# Patient Record
Sex: MALE | Race: WHITE | Employment: UNEMPLOYED | URBAN - METROPOLITAN AREA
[De-identification: names, ages, dates, MRNs, and addresses within clinical notes are randomized per-mention and may not be internally consistent; named-entity substitution may affect disease eponyms.]

---

## 2018-10-11 ENCOUNTER — APPOINTMENT (OUTPATIENT)
Dept: GENERAL RADIOLOGY | Age: 68
DRG: 189 | End: 2018-10-11
Payer: MEDICARE

## 2018-10-11 ENCOUNTER — HOSPITAL ENCOUNTER (INPATIENT)
Age: 68
LOS: 10 days | Discharge: HOME HEALTH | DRG: 189 | End: 2018-10-21
Attending: EMERGENCY MEDICINE | Admitting: INTERNAL MEDICINE
Payer: MEDICARE

## 2018-10-11 DIAGNOSIS — J44.1 COPD EXACERBATION (HCC): ICD-10-CM

## 2018-10-11 DIAGNOSIS — J96.02 ACUTE HYPERCAPNIC RESPIRATORY FAILURE (HCC): Primary | ICD-10-CM

## 2018-10-11 LAB
ANION GAP SERPL CALCULATED.3IONS-SCNC: 8 MMOL/L (ref 3–16)
ANISOCYTOSIS: ABNORMAL
BANDED NEUTROPHILS RELATIVE PERCENT: 2 % (ref 0–7)
BASE EXCESS ARTERIAL: 2.1 MMOL/L (ref -3–3)
BASE EXCESS ARTERIAL: 2.3 MMOL/L (ref -3–3)
BASOPHILS ABSOLUTE: 0.1 K/UL (ref 0–0.2)
BASOPHILS RELATIVE PERCENT: 1 %
BUN BLDV-MCNC: 12 MG/DL (ref 7–20)
CALCIUM SERPL-MCNC: 9.2 MG/DL (ref 8.3–10.6)
CARBOXYHEMOGLOBIN ARTERIAL: 0.7 % (ref 0–1.5)
CARBOXYHEMOGLOBIN ARTERIAL: 0.8 % (ref 0–1.5)
CHLORIDE BLD-SCNC: 99 MMOL/L (ref 99–110)
CO2: 32 MMOL/L (ref 21–32)
CREAT SERPL-MCNC: 0.7 MG/DL (ref 0.8–1.3)
EOSINOPHILS ABSOLUTE: 0.4 K/UL (ref 0–0.6)
EOSINOPHILS RELATIVE PERCENT: 3 %
GFR AFRICAN AMERICAN: >60
GFR NON-AFRICAN AMERICAN: >60
GLUCOSE BLD-MCNC: 102 MG/DL (ref 70–99)
HCO3 ARTERIAL: 29.5 MMOL/L (ref 21–29)
HCO3 ARTERIAL: 32.2 MMOL/L (ref 21–29)
HCT VFR BLD CALC: 44.9 % (ref 40.5–52.5)
HEMATOLOGY PATH CONSULT: YES
HEMOGLOBIN, ART, EXTENDED: 14.5 G/DL (ref 13.5–17.5)
HEMOGLOBIN, ART, EXTENDED: 15.3 G/DL (ref 13.5–17.5)
HEMOGLOBIN: 14.8 G/DL (ref 13.5–17.5)
LACTIC ACID: 0.8 MMOL/L (ref 0.4–2)
LYMPHOCYTES ABSOLUTE: 3.4 K/UL (ref 1–5.1)
LYMPHOCYTES RELATIVE PERCENT: 26 %
MCH RBC QN AUTO: 31.8 PG (ref 26–34)
MCHC RBC AUTO-ENTMCNC: 32.9 G/DL (ref 31–36)
MCV RBC AUTO: 96.7 FL (ref 80–100)
METHEMOGLOBIN ARTERIAL: 0.4 %
METHEMOGLOBIN ARTERIAL: 0.6 %
MONOCYTES ABSOLUTE: 1.8 K/UL (ref 0–1.3)
MONOCYTES RELATIVE PERCENT: 14 %
NEUTROPHILS ABSOLUTE: 7.3 K/UL (ref 1.7–7.7)
NEUTROPHILS RELATIVE PERCENT: 54 %
O2 CONTENT ARTERIAL: 20 ML/DL
O2 CONTENT ARTERIAL: 21 ML/DL
O2 SAT, ARTERIAL: 97.2 %
O2 SAT, ARTERIAL: 99 %
O2 THERAPY: ABNORMAL
O2 THERAPY: ABNORMAL
PCO2 ARTERIAL: 60.1 MMHG (ref 35–45)
PCO2 ARTERIAL: 76.8 MMHG (ref 35–45)
PDW BLD-RTO: 15 % (ref 12.4–15.4)
PH ARTERIAL: 7.25 (ref 7.35–7.45)
PH ARTERIAL: 7.31 (ref 7.35–7.45)
PLATELET # BLD: 205 K/UL (ref 135–450)
PMV BLD AUTO: 10.1 FL (ref 5–10.5)
PO2 ARTERIAL: 193 MMHG (ref 75–108)
PO2 ARTERIAL: 95.6 MMHG (ref 75–108)
POIKILOCYTES: ABNORMAL
POTASSIUM SERPL-SCNC: 4.5 MMOL/L (ref 3.5–5.1)
PRO-BNP: 326 PG/ML (ref 0–124)
RBC # BLD: 4.65 M/UL (ref 4.2–5.9)
SLIDE REVIEW: ABNORMAL
SODIUM BLD-SCNC: 139 MMOL/L (ref 136–145)
TCO2 ARTERIAL: 31.3 MMOL/L
TCO2 ARTERIAL: 34.5 MMOL/L
TROPONIN: <0.01 NG/ML
WBC # BLD: 13 K/UL (ref 4–11)

## 2018-10-11 PROCEDURE — 96375 TX/PRO/DX INJ NEW DRUG ADDON: CPT

## 2018-10-11 PROCEDURE — 36600 WITHDRAWAL OF ARTERIAL BLOOD: CPT

## 2018-10-11 PROCEDURE — 96365 THER/PROPH/DIAG IV INF INIT: CPT

## 2018-10-11 PROCEDURE — 6360000002 HC RX W HCPCS: Performed by: INTERNAL MEDICINE

## 2018-10-11 PROCEDURE — 93005 ELECTROCARDIOGRAM TRACING: CPT | Performed by: EMERGENCY MEDICINE

## 2018-10-11 PROCEDURE — 2580000003 HC RX 258: Performed by: EMERGENCY MEDICINE

## 2018-10-11 PROCEDURE — 87040 BLOOD CULTURE FOR BACTERIA: CPT

## 2018-10-11 PROCEDURE — 80048 BASIC METABOLIC PNL TOTAL CA: CPT

## 2018-10-11 PROCEDURE — 6360000002 HC RX W HCPCS: Performed by: NURSE PRACTITIONER

## 2018-10-11 PROCEDURE — 99223 1ST HOSP IP/OBS HIGH 75: CPT | Performed by: INTERNAL MEDICINE

## 2018-10-11 PROCEDURE — 2500000003 HC RX 250 WO HCPCS: Performed by: EMERGENCY MEDICINE

## 2018-10-11 PROCEDURE — 83605 ASSAY OF LACTIC ACID: CPT

## 2018-10-11 PROCEDURE — 87801 DETECT AGNT MULT DNA AMPLI: CPT

## 2018-10-11 PROCEDURE — 2580000003 HC RX 258: Performed by: INTERNAL MEDICINE

## 2018-10-11 PROCEDURE — 94640 AIRWAY INHALATION TREATMENT: CPT

## 2018-10-11 PROCEDURE — 94762 N-INVAS EAR/PLS OXIMTRY CONT: CPT

## 2018-10-11 PROCEDURE — 82803 BLOOD GASES ANY COMBINATION: CPT

## 2018-10-11 PROCEDURE — 71045 X-RAY EXAM CHEST 1 VIEW: CPT

## 2018-10-11 PROCEDURE — 83880 ASSAY OF NATRIURETIC PEPTIDE: CPT

## 2018-10-11 PROCEDURE — 2700000000 HC OXYGEN THERAPY PER DAY

## 2018-10-11 PROCEDURE — 6360000002 HC RX W HCPCS: Performed by: EMERGENCY MEDICINE

## 2018-10-11 PROCEDURE — 51798 US URINE CAPACITY MEASURE: CPT

## 2018-10-11 PROCEDURE — 85025 COMPLETE CBC W/AUTO DIFF WBC: CPT

## 2018-10-11 PROCEDURE — 84484 ASSAY OF TROPONIN QUANT: CPT

## 2018-10-11 PROCEDURE — 2500000003 HC RX 250 WO HCPCS: Performed by: INTERNAL MEDICINE

## 2018-10-11 PROCEDURE — 99285 EMERGENCY DEPT VISIT HI MDM: CPT

## 2018-10-11 PROCEDURE — 94660 CPAP INITIATION&MGMT: CPT

## 2018-10-11 PROCEDURE — 36415 COLL VENOUS BLD VENIPUNCTURE: CPT

## 2018-10-11 PROCEDURE — 2000000000 HC ICU R&B

## 2018-10-11 RX ORDER — ATORVASTATIN CALCIUM 40 MG/1
20 TABLET, FILM COATED ORAL NIGHTLY
Status: ON HOLD | COMMUNITY
End: 2019-06-10

## 2018-10-11 RX ORDER — PREDNISONE 10 MG/1
10-40 TABLET ORAL DAILY
Status: ON HOLD | COMMUNITY
End: 2018-10-21

## 2018-10-11 RX ORDER — ALBUTEROL SULFATE 90 UG/1
2 AEROSOL, METERED RESPIRATORY (INHALATION) EVERY 6 HOURS PRN
COMMUNITY

## 2018-10-11 RX ORDER — METHYLPREDNISOLONE SODIUM SUCCINATE 125 MG/2ML
125 INJECTION, POWDER, LYOPHILIZED, FOR SOLUTION INTRAMUSCULAR; INTRAVENOUS ONCE
Status: COMPLETED | OUTPATIENT
Start: 2018-10-11 | End: 2018-10-11

## 2018-10-11 RX ORDER — ONDANSETRON 2 MG/ML
4 INJECTION INTRAMUSCULAR; INTRAVENOUS EVERY 6 HOURS PRN
Status: DISCONTINUED | OUTPATIENT
Start: 2018-10-11 | End: 2018-10-21 | Stop reason: HOSPADM

## 2018-10-11 RX ORDER — NICOTINE 21 MG/24HR
1 PATCH, TRANSDERMAL 24 HOURS TRANSDERMAL EVERY 24 HOURS
Status: ON HOLD | COMMUNITY
End: 2019-06-10

## 2018-10-11 RX ORDER — ALBUTEROL SULFATE 2.5 MG/3ML
2.5 SOLUTION RESPIRATORY (INHALATION)
Status: DISCONTINUED | OUTPATIENT
Start: 2018-10-11 | End: 2018-10-12

## 2018-10-11 RX ORDER — BUDESONIDE 0.5 MG/2ML
0.5 INHALANT ORAL 2 TIMES DAILY
Status: DISCONTINUED | OUTPATIENT
Start: 2018-10-11 | End: 2018-10-15

## 2018-10-11 RX ORDER — ASPIRIN 81 MG/1
81 TABLET ORAL DAILY
Status: ON HOLD | COMMUNITY
End: 2019-06-12 | Stop reason: HOSPADM

## 2018-10-11 RX ORDER — GUAIFENESIN/DEXTROMETHORPHAN 100-10MG/5
5 SYRUP ORAL 4 TIMES DAILY PRN
COMMUNITY

## 2018-10-11 RX ORDER — LORAZEPAM 2 MG/ML
1 INJECTION INTRAMUSCULAR ONCE
Status: COMPLETED | OUTPATIENT
Start: 2018-10-11 | End: 2018-10-11

## 2018-10-11 RX ORDER — ALBUTEROL SULFATE 2.5 MG/3ML
SOLUTION RESPIRATORY (INHALATION)
Status: DISCONTINUED
Start: 2018-10-11 | End: 2018-10-11

## 2018-10-11 RX ORDER — METHYLPREDNISOLONE SODIUM SUCCINATE 40 MG/ML
40 INJECTION, POWDER, LYOPHILIZED, FOR SOLUTION INTRAMUSCULAR; INTRAVENOUS EVERY 6 HOURS
Status: DISCONTINUED | OUTPATIENT
Start: 2018-10-11 | End: 2018-10-13

## 2018-10-11 RX ORDER — BUDESONIDE AND FORMOTEROL FUMARATE DIHYDRATE 80; 4.5 UG/1; UG/1
2 AEROSOL RESPIRATORY (INHALATION) 2 TIMES DAILY
COMMUNITY

## 2018-10-11 RX ORDER — TAMSULOSIN HYDROCHLORIDE 0.4 MG/1
0.4 CAPSULE ORAL NIGHTLY
COMMUNITY

## 2018-10-11 RX ORDER — METHYLPREDNISOLONE SODIUM SUCCINATE 40 MG/ML
40 INJECTION, POWDER, LYOPHILIZED, FOR SOLUTION INTRAMUSCULAR; INTRAVENOUS EVERY 12 HOURS
Status: DISCONTINUED | OUTPATIENT
Start: 2018-10-12 | End: 2018-10-11

## 2018-10-11 RX ORDER — POLYETHYLENE GLYCOL 3350 17 G
2 POWDER IN PACKET (EA) ORAL PRN
Status: ON HOLD | COMMUNITY
End: 2019-06-10

## 2018-10-11 RX ORDER — OMEPRAZOLE 20 MG/1
20 CAPSULE, DELAYED RELEASE ORAL DAILY
Status: ON HOLD | COMMUNITY
End: 2019-06-12 | Stop reason: HOSPADM

## 2018-10-11 RX ORDER — ALBUTEROL SULFATE 2.5 MG/3ML
2.5 SOLUTION RESPIRATORY (INHALATION) EVERY 6 HOURS PRN
COMMUNITY

## 2018-10-11 RX ORDER — FORMOTEROL FUMARATE 20 UG/2ML
20 SOLUTION RESPIRATORY (INHALATION) 2 TIMES DAILY
Status: DISCONTINUED | OUTPATIENT
Start: 2018-10-11 | End: 2018-10-21 | Stop reason: HOSPADM

## 2018-10-11 RX ORDER — LOSARTAN POTASSIUM 100 MG/1
50 TABLET ORAL DAILY
Status: ON HOLD | COMMUNITY
End: 2019-06-10

## 2018-10-11 RX ORDER — SODIUM CHLORIDE 0.9 % (FLUSH) 0.9 %
10 SYRINGE (ML) INJECTION PRN
Status: DISCONTINUED | OUTPATIENT
Start: 2018-10-11 | End: 2018-10-21 | Stop reason: HOSPADM

## 2018-10-11 RX ORDER — IPRATROPIUM BROMIDE AND ALBUTEROL SULFATE 2.5; .5 MG/3ML; MG/3ML
1 SOLUTION RESPIRATORY (INHALATION)
Status: DISCONTINUED | OUTPATIENT
Start: 2018-10-11 | End: 2018-10-11

## 2018-10-11 RX ORDER — SODIUM CHLORIDE 0.9 % (FLUSH) 0.9 %
10 SYRINGE (ML) INJECTION EVERY 12 HOURS SCHEDULED
Status: DISCONTINUED | OUTPATIENT
Start: 2018-10-11 | End: 2018-10-21 | Stop reason: HOSPADM

## 2018-10-11 RX ADMIN — DOXYCYCLINE 100 MG: 100 INJECTION, POWDER, LYOPHILIZED, FOR SOLUTION INTRAVENOUS at 20:53

## 2018-10-11 RX ADMIN — LORAZEPAM 1 MG: 2 INJECTION INTRAMUSCULAR; INTRAVENOUS at 13:18

## 2018-10-11 RX ADMIN — Medication 10 ML: at 20:53

## 2018-10-11 RX ADMIN — ENOXAPARIN SODIUM 40 MG: 40 INJECTION SUBCUTANEOUS at 15:37

## 2018-10-11 RX ADMIN — DOXYCYCLINE 100 MG: 100 INJECTION, POWDER, LYOPHILIZED, FOR SOLUTION INTRAVENOUS at 14:31

## 2018-10-11 RX ADMIN — ALBUTEROL SULFATE 2.5 MG: 2.5 SOLUTION RESPIRATORY (INHALATION) at 19:45

## 2018-10-11 RX ADMIN — FORMOTEROL FUMARATE DIHYDRATE 20 MCG: 20 SOLUTION RESPIRATORY (INHALATION) at 19:44

## 2018-10-11 RX ADMIN — METHYLPREDNISOLONE SODIUM SUCCINATE 40 MG: 40 INJECTION, POWDER, FOR SOLUTION INTRAMUSCULAR; INTRAVENOUS at 17:51

## 2018-10-11 RX ADMIN — ALBUTEROL SULFATE 2.5 MG: 2.5 SOLUTION RESPIRATORY (INHALATION) at 16:08

## 2018-10-11 RX ADMIN — BUDESONIDE 500 MCG: 0.5 SUSPENSION RESPIRATORY (INHALATION) at 19:45

## 2018-10-11 RX ADMIN — METHYLPREDNISOLONE SODIUM SUCCINATE 125 MG: 125 INJECTION, POWDER, FOR SOLUTION INTRAMUSCULAR; INTRAVENOUS at 13:20

## 2018-10-11 ASSESSMENT — PAIN SCALES - GENERAL
PAINLEVEL_OUTOF10: 0

## 2018-10-11 NOTE — PROGRESS NOTES
Wife stated patient had flu and pneum vaccine last week (Oct. 2018) at [facility], wife unsure what pneum vaccine he received , this RN asked wife to bring in copy of vaccine documentation when  she can

## 2018-10-11 NOTE — ED NOTES
Pt arrived to the ED via EMS, pt was having difficulty breathing since las evening, pt states this afternoon was severe, family called 911, pt arrived to ED on a non rebreather, pt is alert and orient, pt denies any pain, pt has increased respirations, pt's daughter is at the bedside      Artur Reddy, RN  10/11/18 [id number]

## 2018-10-11 NOTE — CONSULTS
abnormality. Findings suggestive COPD.       Mild asymmetric interstitial opacities are seen within the left base which   may be related to scar or fibrosis.  No definite acute process.

## 2018-10-11 NOTE — ED PROVIDER NOTES
distressed. HENT:   Head: Normocephalic and atraumatic. Eyes: Conjunctivae and EOM are normal. Pupils are equal, round, and reactive to light. Right eye exhibits no discharge. Left eye exhibits no discharge. No scleral icterus. Neck: No JVD present. No tracheal deviation present. Cardiovascular: Normal rate, regular rhythm, normal heart sounds and intact distal pulses. Exam reveals no gallop and no friction rub. No murmur heard. Healed-appearing midline sternotomy incision. Pulmonary/Chest: Pt has diffuse wheezing b/l. Pt has retractions. Pt is not cyanotic. Pt speaks in words, not complete sentences. No rales. No rhonchi. Abdominal: Soft. Bowel sounds are normal. He exhibits no distension and no mass. There is no tenderness. There is no rebound and no guarding. No HSM. Musculoskeletal: He exhibits no edema. No lower ext ttp or palpable cords. Neurological: He is alert and oriented to person, place, and time. He exhibits normal muscle tone. Coordination normal.   Skin: No rash noted. He is not diaphoretic. No erythema. No pallor.      PROCEDURES   Procedures    DIAGNOSTIC RESULTS   DIAGNOSTICS:   Labs:  Recent Results (from the past 24 hour(s))   CBC Auto Differential    Collection Time: 10/11/18  1:23 PM   Result Value Ref Range    WBC 13.0 (H) 4.0 - 11.0 K/uL    RBC 4.65 4.20 - 5.90 M/uL    Hemoglobin 14.8 13.5 - 17.5 g/dL    Hematocrit 44.9 40.5 - 52.5 %    MCV 96.7 80.0 - 100.0 fL    MCH 31.8 26.0 - 34.0 pg    MCHC 32.9 31.0 - 36.0 g/dL    RDW 15.0 12.4 - 15.4 %    Platelets 863 495 - 770 K/uL    MPV 10.1 5.0 - 10.5 fL    SLIDE REVIEW see below     Path Consult Yes     Neutrophils % 54.0 %    Lymphocytes % 26.0 %    Monocytes % 14.0 %    Eosinophils % 3.0 %    Basophils % 1.0 %    Neutrophils # 7.3 1.7 - 7.7 K/uL    Lymphocytes # 3.4 1.0 - 5.1 K/uL    Monocytes # 1.8 (H) 0.0 - 1.3 K/uL    Eosinophils # 0.4 0.0 - 0.6 K/uL    Basophils # 0.1 0.0 - 0.2 K/uL    Bands Relative 2 0 - 7 %    Anisocytosis 110. There is 1 PVC present. Axis is   Right axis deviation  QTc is  within an acceptable range  ST Segments: no STEMI.    []   7436 D/w hospitalist who is admitting pt  []      ED Course User Index  [] Myesha Duffy MD       EMERGENCY DEPARTMENT MEDICAL DECISION MAKING:  After obtaining the patient's history, performing thephysical exam and reviewing the diagnostics, multiple  initial diagnoses were considered based on the presenting problem. Pt started on BiPAP on arrival in ED. Pt improved in ED w/ BiPAP. He is tolerating BiPAP well. GCS 15. Pt says no h/o VTE or recent travel/surg/immobility. PE unlikely. Pt reassessed mult times in ED & no chest pain. Critical Care Time:  Total critical care time for this patient was 32 minutes exclusive of separately billable procedures. DIAGNOSIS:  After the evaluation in the Emergency Department, my clinical impression is acute hypercapnic respiratory failure, COPD exacerbation . ED Meds:  Continuous albuterol nebulizer  IV solumedrol  IVPB doxycycline  IV ativan    PLAN: admit to ICU.       (Please note that portions of this note were completed with a voice recognition program.  Efforts were made to edit the dictations but occasionally words are mis-transcribed.)    yMesha Duffy MD (electronically signed)         Myesha Duffy MD  10/11/18 1945

## 2018-10-12 LAB
ANION GAP SERPL CALCULATED.3IONS-SCNC: 13 MMOL/L (ref 3–16)
BASOPHILS ABSOLUTE: 0 K/UL (ref 0–0.2)
BASOPHILS RELATIVE PERCENT: 0.5 %
BUN BLDV-MCNC: 19 MG/DL (ref 7–20)
CALCIUM SERPL-MCNC: 9.2 MG/DL (ref 8.3–10.6)
CHLORIDE BLD-SCNC: 97 MMOL/L (ref 99–110)
CO2: 29 MMOL/L (ref 21–32)
CREAT SERPL-MCNC: 0.7 MG/DL (ref 0.8–1.3)
EKG ATRIAL RATE: 110 BPM
EKG DIAGNOSIS: NORMAL
EKG P AXIS: 81 DEGREES
EKG P-R INTERVAL: 166 MS
EKG Q-T INTERVAL: 340 MS
EKG QRS DURATION: 94 MS
EKG QTC CALCULATION (BAZETT): 460 MS
EKG R AXIS: 110 DEGREES
EKG T AXIS: 74 DEGREES
EKG VENTRICULAR RATE: 110 BPM
EOSINOPHILS ABSOLUTE: 0 K/UL (ref 0–0.6)
EOSINOPHILS RELATIVE PERCENT: 0 %
GFR AFRICAN AMERICAN: >60
GFR NON-AFRICAN AMERICAN: >60
GLUCOSE BLD-MCNC: 121 MG/DL (ref 70–99)
HCT VFR BLD CALC: 42 % (ref 40.5–52.5)
HEMATOLOGY PATH CONSULT: NORMAL
HEMOGLOBIN: 14 G/DL (ref 13.5–17.5)
LYMPHOCYTES ABSOLUTE: 0.7 K/UL (ref 1–5.1)
LYMPHOCYTES RELATIVE PERCENT: 7 %
MAGNESIUM: 2.1 MG/DL (ref 1.8–2.4)
MCH RBC QN AUTO: 31.9 PG (ref 26–34)
MCHC RBC AUTO-ENTMCNC: 33.4 G/DL (ref 31–36)
MCV RBC AUTO: 95.7 FL (ref 80–100)
MONOCYTES ABSOLUTE: 0.2 K/UL (ref 0–1.3)
MONOCYTES RELATIVE PERCENT: 2.6 %
NEUTROPHILS ABSOLUTE: 8.4 K/UL (ref 1.7–7.7)
NEUTROPHILS RELATIVE PERCENT: 89.9 %
PDW BLD-RTO: 15.2 % (ref 12.4–15.4)
PHOSPHORUS: 4.4 MG/DL (ref 2.5–4.9)
PLATELET # BLD: 201 K/UL (ref 135–450)
PMV BLD AUTO: 10.5 FL (ref 5–10.5)
POTASSIUM SERPL-SCNC: 4.8 MMOL/L (ref 3.5–5.1)
PROCALCITONIN: 0.06 NG/ML (ref 0–0.15)
RBC # BLD: 4.39 M/UL (ref 4.2–5.9)
REPORT: NORMAL
SODIUM BLD-SCNC: 139 MMOL/L (ref 136–145)
WBC # BLD: 9.4 K/UL (ref 4–11)

## 2018-10-12 PROCEDURE — 85025 COMPLETE CBC W/AUTO DIFF WBC: CPT

## 2018-10-12 PROCEDURE — 94640 AIRWAY INHALATION TREATMENT: CPT

## 2018-10-12 PROCEDURE — 93010 ELECTROCARDIOGRAM REPORT: CPT | Performed by: INTERNAL MEDICINE

## 2018-10-12 PROCEDURE — 2000000000 HC ICU R&B

## 2018-10-12 PROCEDURE — 2500000003 HC RX 250 WO HCPCS: Performed by: INTERNAL MEDICINE

## 2018-10-12 PROCEDURE — 2580000003 HC RX 258: Performed by: INTERNAL MEDICINE

## 2018-10-12 PROCEDURE — 6370000000 HC RX 637 (ALT 250 FOR IP): Performed by: INTERNAL MEDICINE

## 2018-10-12 PROCEDURE — 36415 COLL VENOUS BLD VENIPUNCTURE: CPT

## 2018-10-12 PROCEDURE — 6360000002 HC RX W HCPCS: Performed by: NURSE PRACTITIONER

## 2018-10-12 PROCEDURE — 94762 N-INVAS EAR/PLS OXIMTRY CONT: CPT

## 2018-10-12 PROCEDURE — 94660 CPAP INITIATION&MGMT: CPT

## 2018-10-12 PROCEDURE — 84145 PROCALCITONIN (PCT): CPT

## 2018-10-12 PROCEDURE — C9113 INJ PANTOPRAZOLE SODIUM, VIA: HCPCS | Performed by: INTERNAL MEDICINE

## 2018-10-12 PROCEDURE — 99233 SBSQ HOSP IP/OBS HIGH 50: CPT | Performed by: INTERNAL MEDICINE

## 2018-10-12 PROCEDURE — 80048 BASIC METABOLIC PNL TOTAL CA: CPT

## 2018-10-12 PROCEDURE — 6360000002 HC RX W HCPCS: Performed by: INTERNAL MEDICINE

## 2018-10-12 PROCEDURE — 84100 ASSAY OF PHOSPHORUS: CPT

## 2018-10-12 PROCEDURE — 2700000000 HC OXYGEN THERAPY PER DAY

## 2018-10-12 PROCEDURE — 83735 ASSAY OF MAGNESIUM: CPT

## 2018-10-12 RX ORDER — PANTOPRAZOLE SODIUM 40 MG/10ML
40 INJECTION, POWDER, LYOPHILIZED, FOR SOLUTION INTRAVENOUS 2 TIMES DAILY
Status: DISCONTINUED | OUTPATIENT
Start: 2018-10-12 | End: 2018-10-21 | Stop reason: HOSPADM

## 2018-10-12 RX ORDER — ASPIRIN 81 MG/1
81 TABLET ORAL DAILY
Status: DISCONTINUED | OUTPATIENT
Start: 2018-10-12 | End: 2018-10-21 | Stop reason: HOSPADM

## 2018-10-12 RX ORDER — TAMSULOSIN HYDROCHLORIDE 0.4 MG/1
0.4 CAPSULE ORAL DAILY
Status: DISCONTINUED | OUTPATIENT
Start: 2018-10-12 | End: 2018-10-21 | Stop reason: HOSPADM

## 2018-10-12 RX ORDER — PANTOPRAZOLE SODIUM 40 MG/10ML
40 INJECTION, POWDER, LYOPHILIZED, FOR SOLUTION INTRAVENOUS 2 TIMES DAILY
Status: DISCONTINUED | OUTPATIENT
Start: 2018-10-12 | End: 2018-10-12

## 2018-10-12 RX ORDER — THEOPHYLLINE 400 MG/1
400 TABLET, EXTENDED RELEASE ORAL DAILY
Status: DISCONTINUED | OUTPATIENT
Start: 2018-10-12 | End: 2018-10-21 | Stop reason: HOSPADM

## 2018-10-12 RX ORDER — ALBUTEROL SULFATE 2.5 MG/3ML
2.5 SOLUTION RESPIRATORY (INHALATION)
Status: DISCONTINUED | OUTPATIENT
Start: 2018-10-12 | End: 2018-10-13

## 2018-10-12 RX ORDER — CLONIDINE HYDROCHLORIDE 0.1 MG/1
0.2 TABLET ORAL EVERY 4 HOURS PRN
Status: DISCONTINUED | OUTPATIENT
Start: 2018-10-12 | End: 2018-10-21 | Stop reason: HOSPADM

## 2018-10-12 RX ORDER — 0.9 % SODIUM CHLORIDE 0.9 %
10 VIAL (ML) INJECTION 2 TIMES DAILY
Status: DISCONTINUED | OUTPATIENT
Start: 2018-10-12 | End: 2018-10-12

## 2018-10-12 RX ORDER — PANTOPRAZOLE SODIUM 40 MG/1
40 TABLET, DELAYED RELEASE ORAL
Status: DISCONTINUED | OUTPATIENT
Start: 2018-10-12 | End: 2018-10-12

## 2018-10-12 RX ORDER — LOSARTAN POTASSIUM 50 MG/1
100 TABLET ORAL DAILY
Status: DISCONTINUED | OUTPATIENT
Start: 2018-10-12 | End: 2018-10-21 | Stop reason: HOSPADM

## 2018-10-12 RX ORDER — DOXYCYCLINE HYCLATE 100 MG
100 TABLET ORAL EVERY 12 HOURS SCHEDULED
Status: DISCONTINUED | OUTPATIENT
Start: 2018-10-12 | End: 2018-10-12

## 2018-10-12 RX ORDER — 0.9 % SODIUM CHLORIDE 0.9 %
10 VIAL (ML) INJECTION 2 TIMES DAILY
Status: DISCONTINUED | OUTPATIENT
Start: 2018-10-12 | End: 2018-10-21 | Stop reason: HOSPADM

## 2018-10-12 RX ADMIN — PANTOPRAZOLE SODIUM 40 MG: 40 INJECTION, POWDER, FOR SOLUTION INTRAVENOUS at 19:19

## 2018-10-12 RX ADMIN — LOSARTAN POTASSIUM 100 MG: 50 TABLET ORAL at 09:57

## 2018-10-12 RX ADMIN — FORMOTEROL FUMARATE DIHYDRATE 20 MCG: 20 SOLUTION RESPIRATORY (INHALATION) at 19:48

## 2018-10-12 RX ADMIN — ONDANSETRON 4 MG: 2 INJECTION INTRAMUSCULAR; INTRAVENOUS at 15:50

## 2018-10-12 RX ADMIN — ASPIRIN 81 MG: 81 TABLET, COATED ORAL at 09:57

## 2018-10-12 RX ADMIN — PANTOPRAZOLE SODIUM 40 MG: 40 TABLET, DELAYED RELEASE ORAL at 09:57

## 2018-10-12 RX ADMIN — METHYLPREDNISOLONE SODIUM SUCCINATE 40 MG: 40 INJECTION, POWDER, FOR SOLUTION INTRAMUSCULAR; INTRAVENOUS at 12:54

## 2018-10-12 RX ADMIN — TAMSULOSIN HYDROCHLORIDE 0.4 MG: 0.4 CAPSULE ORAL at 09:56

## 2018-10-12 RX ADMIN — BUDESONIDE 500 MCG: 0.5 SUSPENSION RESPIRATORY (INHALATION) at 19:48

## 2018-10-12 RX ADMIN — ALBUTEROL SULFATE 5 MG: 2.5 SOLUTION RESPIRATORY (INHALATION) at 12:22

## 2018-10-12 RX ADMIN — THEOPHYLLINE 400 MG: 400 TABLET, EXTENDED RELEASE ORAL at 09:56

## 2018-10-12 RX ADMIN — METHYLPREDNISOLONE SODIUM SUCCINATE 40 MG: 40 INJECTION, POWDER, FOR SOLUTION INTRAMUSCULAR; INTRAVENOUS at 00:43

## 2018-10-12 RX ADMIN — ENOXAPARIN SODIUM 40 MG: 40 INJECTION SUBCUTANEOUS at 09:56

## 2018-10-12 RX ADMIN — ALBUTEROL SULFATE 2.5 MG: 2.5 SOLUTION RESPIRATORY (INHALATION) at 16:07

## 2018-10-12 RX ADMIN — LIDOCAINE HYDROCHLORIDE: 20 SOLUTION ORAL; TOPICAL at 19:24

## 2018-10-12 RX ADMIN — METHYLPREDNISOLONE SODIUM SUCCINATE 40 MG: 40 INJECTION, POWDER, FOR SOLUTION INTRAMUSCULAR; INTRAVENOUS at 05:54

## 2018-10-12 RX ADMIN — Medication 10 ML: at 19:19

## 2018-10-12 RX ADMIN — METHYLPREDNISOLONE SODIUM SUCCINATE 40 MG: 40 INJECTION, POWDER, FOR SOLUTION INTRAMUSCULAR; INTRAVENOUS at 23:14

## 2018-10-12 RX ADMIN — ALBUTEROL SULFATE 5 MG: 2.5 SOLUTION RESPIRATORY (INHALATION) at 07:59

## 2018-10-12 RX ADMIN — METHYLPREDNISOLONE SODIUM SUCCINATE 40 MG: 40 INJECTION, POWDER, FOR SOLUTION INTRAMUSCULAR; INTRAVENOUS at 17:51

## 2018-10-12 RX ADMIN — LEVOFLOXACIN 750 MG: 500 TABLET, FILM COATED ORAL at 12:46

## 2018-10-12 RX ADMIN — Medication 10 ML: at 09:57

## 2018-10-12 RX ADMIN — FORMOTEROL FUMARATE DIHYDRATE 20 MCG: 20 SOLUTION RESPIRATORY (INHALATION) at 08:00

## 2018-10-12 RX ADMIN — DOXYCYCLINE 100 MG: 100 INJECTION, POWDER, LYOPHILIZED, FOR SOLUTION INTRAVENOUS at 09:52

## 2018-10-12 RX ADMIN — Medication 10 ML: at 20:51

## 2018-10-12 RX ADMIN — ONDANSETRON 4 MG: 2 INJECTION INTRAMUSCULAR; INTRAVENOUS at 23:14

## 2018-10-12 RX ADMIN — ALBUTEROL SULFATE 2.5 MG: 2.5 SOLUTION RESPIRATORY (INHALATION) at 19:49

## 2018-10-12 RX ADMIN — TIOTROPIUM BROMIDE 18 MCG: 18 CAPSULE ORAL; RESPIRATORY (INHALATION) at 12:21

## 2018-10-12 RX ADMIN — BUDESONIDE 500 MCG: 0.5 SUSPENSION RESPIRATORY (INHALATION) at 08:00

## 2018-10-12 RX ADMIN — CLONIDINE HYDROCHLORIDE 0.2 MG: 0.1 TABLET ORAL at 20:46

## 2018-10-12 ASSESSMENT — PAIN SCALES - GENERAL
PAINLEVEL_OUTOF10: 0
PAINLEVEL_OUTOF10: 0

## 2018-10-12 NOTE — PROGRESS NOTES
2330: Resumed care from out going Norma Campbell, [facility]. Pt remain on BiPAP 20/5 Rate 20 FiO2 50% and O2 sats 92%. HR in 70's-80's NSR with PVC's. Pt sleeping with eyes closed. 0105: Pt assisted to bed side commode. Void and back in bed. Pt remain on BIPAP. Denies any acute distress. 5665: Remain on BIPAP. Tolerating well. No acute distress noted. 9195: shift hand off report given to oncelder Proctor RN.

## 2018-10-13 ENCOUNTER — APPOINTMENT (OUTPATIENT)
Dept: GENERAL RADIOLOGY | Age: 68
DRG: 189 | End: 2018-10-13
Payer: MEDICARE

## 2018-10-13 LAB
ALBUMIN SERPL-MCNC: 4 G/DL (ref 3.4–5)
ALP BLD-CCNC: 58 U/L (ref 40–129)
ALT SERPL-CCNC: 22 U/L (ref 10–40)
ANION GAP SERPL CALCULATED.3IONS-SCNC: 14 MMOL/L (ref 3–16)
AST SERPL-CCNC: 28 U/L (ref 15–37)
BASOPHILS ABSOLUTE: 0 K/UL (ref 0–0.2)
BASOPHILS RELATIVE PERCENT: 0.2 %
BILIRUB SERPL-MCNC: 0.5 MG/DL (ref 0–1)
BILIRUBIN DIRECT: <0.2 MG/DL (ref 0–0.3)
BILIRUBIN, INDIRECT: NORMAL MG/DL (ref 0–1)
BUN BLDV-MCNC: 29 MG/DL (ref 7–20)
CALCIUM SERPL-MCNC: 9.3 MG/DL (ref 8.3–10.6)
CHLORIDE BLD-SCNC: 98 MMOL/L (ref 99–110)
CO2: 29 MMOL/L (ref 21–32)
CREAT SERPL-MCNC: 0.9 MG/DL (ref 0.8–1.3)
EOSINOPHILS ABSOLUTE: 0 K/UL (ref 0–0.6)
EOSINOPHILS RELATIVE PERCENT: 0 %
GFR AFRICAN AMERICAN: >60
GFR NON-AFRICAN AMERICAN: >60
GLUCOSE BLD-MCNC: 149 MG/DL (ref 70–99)
HCT VFR BLD CALC: 43.8 % (ref 40.5–52.5)
HEMOGLOBIN: 14.3 G/DL (ref 13.5–17.5)
LV EF: 65 %
LVEF MODALITY: NORMAL
LYMPHOCYTES ABSOLUTE: 0.4 K/UL (ref 1–5.1)
LYMPHOCYTES RELATIVE PERCENT: 2.4 %
MAGNESIUM: 2.5 MG/DL (ref 1.8–2.4)
MCH RBC QN AUTO: 31.5 PG (ref 26–34)
MCHC RBC AUTO-ENTMCNC: 32.5 G/DL (ref 31–36)
MCV RBC AUTO: 96.8 FL (ref 80–100)
MONOCYTES ABSOLUTE: 0.7 K/UL (ref 0–1.3)
MONOCYTES RELATIVE PERCENT: 4.3 %
NEUTROPHILS ABSOLUTE: 15.9 K/UL (ref 1.7–7.7)
NEUTROPHILS RELATIVE PERCENT: 93.1 %
PDW BLD-RTO: 15.5 % (ref 12.4–15.4)
PHOSPHORUS: 4.1 MG/DL (ref 2.5–4.9)
PLATELET # BLD: 235 K/UL (ref 135–450)
PMV BLD AUTO: 10.3 FL (ref 5–10.5)
POTASSIUM SERPL-SCNC: 4.4 MMOL/L (ref 3.5–5.1)
PROCALCITONIN: 0.05 NG/ML (ref 0–0.15)
RBC # BLD: 4.52 M/UL (ref 4.2–5.9)
SODIUM BLD-SCNC: 141 MMOL/L (ref 136–145)
TOTAL PROTEIN: 7.3 G/DL (ref 6.4–8.2)
WBC # BLD: 17 K/UL (ref 4–11)

## 2018-10-13 PROCEDURE — 6360000002 HC RX W HCPCS: Performed by: INTERNAL MEDICINE

## 2018-10-13 PROCEDURE — 6370000000 HC RX 637 (ALT 250 FOR IP): Performed by: INTERNAL MEDICINE

## 2018-10-13 PROCEDURE — 2580000003 HC RX 258: Performed by: INTERNAL MEDICINE

## 2018-10-13 PROCEDURE — 71045 X-RAY EXAM CHEST 1 VIEW: CPT

## 2018-10-13 PROCEDURE — 36415 COLL VENOUS BLD VENIPUNCTURE: CPT

## 2018-10-13 PROCEDURE — G8978 MOBILITY CURRENT STATUS: HCPCS

## 2018-10-13 PROCEDURE — G8988 SELF CARE GOAL STATUS: HCPCS

## 2018-10-13 PROCEDURE — 97530 THERAPEUTIC ACTIVITIES: CPT

## 2018-10-13 PROCEDURE — 87040 BLOOD CULTURE FOR BACTERIA: CPT

## 2018-10-13 PROCEDURE — 94762 N-INVAS EAR/PLS OXIMTRY CONT: CPT

## 2018-10-13 PROCEDURE — 99233 SBSQ HOSP IP/OBS HIGH 50: CPT | Performed by: INTERNAL MEDICINE

## 2018-10-13 PROCEDURE — 80076 HEPATIC FUNCTION PANEL: CPT

## 2018-10-13 PROCEDURE — 2700000000 HC OXYGEN THERAPY PER DAY

## 2018-10-13 PROCEDURE — 2000000000 HC ICU R&B

## 2018-10-13 PROCEDURE — 84100 ASSAY OF PHOSPHORUS: CPT

## 2018-10-13 PROCEDURE — 6360000002 HC RX W HCPCS: Performed by: NURSE PRACTITIONER

## 2018-10-13 PROCEDURE — G8987 SELF CARE CURRENT STATUS: HCPCS

## 2018-10-13 PROCEDURE — 83735 ASSAY OF MAGNESIUM: CPT

## 2018-10-13 PROCEDURE — 85025 COMPLETE CBC W/AUTO DIFF WBC: CPT

## 2018-10-13 PROCEDURE — 80048 BASIC METABOLIC PNL TOTAL CA: CPT

## 2018-10-13 PROCEDURE — 99223 1ST HOSP IP/OBS HIGH 75: CPT | Performed by: INTERNAL MEDICINE

## 2018-10-13 PROCEDURE — 94640 AIRWAY INHALATION TREATMENT: CPT

## 2018-10-13 PROCEDURE — 97166 OT EVAL MOD COMPLEX 45 MIN: CPT

## 2018-10-13 PROCEDURE — 97162 PT EVAL MOD COMPLEX 30 MIN: CPT

## 2018-10-13 PROCEDURE — 93306 TTE W/DOPPLER COMPLETE: CPT

## 2018-10-13 PROCEDURE — 84145 PROCALCITONIN (PCT): CPT

## 2018-10-13 PROCEDURE — G8979 MOBILITY GOAL STATUS: HCPCS

## 2018-10-13 PROCEDURE — C9113 INJ PANTOPRAZOLE SODIUM, VIA: HCPCS | Performed by: INTERNAL MEDICINE

## 2018-10-13 RX ORDER — ALBUTEROL SULFATE 2.5 MG/3ML
2.5 SOLUTION RESPIRATORY (INHALATION)
Status: DISCONTINUED | OUTPATIENT
Start: 2018-10-13 | End: 2018-10-21 | Stop reason: HOSPADM

## 2018-10-13 RX ORDER — METHYLPREDNISOLONE SODIUM SUCCINATE 40 MG/ML
40 INJECTION, POWDER, LYOPHILIZED, FOR SOLUTION INTRAMUSCULAR; INTRAVENOUS EVERY 12 HOURS
Status: DISCONTINUED | OUTPATIENT
Start: 2018-10-13 | End: 2018-10-16

## 2018-10-13 RX ADMIN — ALBUTEROL SULFATE 2.5 MG: 2.5 SOLUTION RESPIRATORY (INHALATION) at 04:32

## 2018-10-13 RX ADMIN — LEVOFLOXACIN 750 MG: 500 TABLET, FILM COATED ORAL at 09:27

## 2018-10-13 RX ADMIN — FORMOTEROL FUMARATE DIHYDRATE 20 MCG: 20 SOLUTION RESPIRATORY (INHALATION) at 20:36

## 2018-10-13 RX ADMIN — PANTOPRAZOLE SODIUM 40 MG: 40 INJECTION, POWDER, FOR SOLUTION INTRAVENOUS at 20:32

## 2018-10-13 RX ADMIN — BUDESONIDE 500 MCG: 0.5 SUSPENSION RESPIRATORY (INHALATION) at 08:08

## 2018-10-13 RX ADMIN — BUDESONIDE 500 MCG: 0.5 SUSPENSION RESPIRATORY (INHALATION) at 20:36

## 2018-10-13 RX ADMIN — Medication 10 ML: at 09:28

## 2018-10-13 RX ADMIN — TIOTROPIUM BROMIDE 18 MCG: 18 CAPSULE ORAL; RESPIRATORY (INHALATION) at 08:08

## 2018-10-13 RX ADMIN — ALBUTEROL SULFATE 2.5 MG: 2.5 SOLUTION RESPIRATORY (INHALATION) at 01:07

## 2018-10-13 RX ADMIN — PANTOPRAZOLE SODIUM 40 MG: 40 INJECTION, POWDER, FOR SOLUTION INTRAVENOUS at 09:27

## 2018-10-13 RX ADMIN — ALBUTEROL SULFATE 2.5 MG: 2.5 SOLUTION RESPIRATORY (INHALATION) at 20:36

## 2018-10-13 RX ADMIN — CEFTRIAXONE 2 G: 2 INJECTION, POWDER, FOR SOLUTION INTRAMUSCULAR; INTRAVENOUS at 11:42

## 2018-10-13 RX ADMIN — ENOXAPARIN SODIUM 40 MG: 40 INJECTION SUBCUTANEOUS at 09:27

## 2018-10-13 RX ADMIN — Medication 10 ML: at 20:33

## 2018-10-13 RX ADMIN — FORMOTEROL FUMARATE DIHYDRATE 20 MCG: 20 SOLUTION RESPIRATORY (INHALATION) at 08:07

## 2018-10-13 RX ADMIN — ALBUTEROL SULFATE 2.5 MG: 2.5 SOLUTION RESPIRATORY (INHALATION) at 08:07

## 2018-10-13 RX ADMIN — ALBUTEROL SULFATE 2.5 MG: 2.5 SOLUTION RESPIRATORY (INHALATION) at 15:52

## 2018-10-13 RX ADMIN — TAMSULOSIN HYDROCHLORIDE 0.4 MG: 0.4 CAPSULE ORAL at 09:27

## 2018-10-13 RX ADMIN — ALBUTEROL SULFATE 2.5 MG: 2.5 SOLUTION RESPIRATORY (INHALATION) at 12:00

## 2018-10-13 RX ADMIN — METHYLPREDNISOLONE SODIUM SUCCINATE 40 MG: 40 INJECTION, POWDER, FOR SOLUTION INTRAMUSCULAR; INTRAVENOUS at 06:13

## 2018-10-13 RX ADMIN — Medication 10 ML: at 20:32

## 2018-10-13 RX ADMIN — ASPIRIN 81 MG: 81 TABLET, COATED ORAL at 09:27

## 2018-10-13 RX ADMIN — METHYLPREDNISOLONE SODIUM SUCCINATE 40 MG: 40 INJECTION, POWDER, FOR SOLUTION INTRAMUSCULAR; INTRAVENOUS at 18:24

## 2018-10-13 RX ADMIN — LOSARTAN POTASSIUM 100 MG: 50 TABLET ORAL at 09:27

## 2018-10-13 RX ADMIN — Medication 10 ML: at 09:33

## 2018-10-13 RX ADMIN — CLONIDINE HYDROCHLORIDE 0.2 MG: 0.1 TABLET ORAL at 04:07

## 2018-10-13 RX ADMIN — THEOPHYLLINE 400 MG: 400 TABLET, EXTENDED RELEASE ORAL at 09:27

## 2018-10-13 ASSESSMENT — PAIN SCALES - GENERAL
PAINLEVEL_OUTOF10: 0

## 2018-10-13 NOTE — PROGRESS NOTES
breath     HISTORY:  He has intermittent dyspnea at rest but severe dyspnea on exertion. He has cough and wheezing. REVIEW OF SYMPTOMS:  No chest pain or palpitations. He has heartburn with nausea. IV:      Intake/Output Summary (Last 24 hours) at 10/13/18 1004  Last data filed at 10/13/18 0958   Gross per 24 hour   Intake             1320 ml   Output              980 ml   Net              340 ml       MEDICATIONS:  Scheduled Meds:   albuterol  2.5 mg Nebulization Q4H WA    methylPREDNISolone  40 mg Intravenous Q12H    cefTRIAXone (ROCEPHIN) IV  2 g Intravenous Once    theophylline  400 mg Oral Daily    tamsulosin  0.4 mg Oral Daily    losartan  100 mg Oral Daily    aspirin  81 mg Oral Daily    tiotropium  18 mcg Inhalation Daily    levofloxacin  750 mg Oral Daily    pantoprazole  40 mg Intravenous BID    And    sodium chloride (PF)  10 mL Intravenous BID    sodium chloride flush  10 mL Intravenous 2 times per day    enoxaparin  40 mg Subcutaneous Daily    budesonide  0.5 mg Nebulization BID    formoterol  20 mcg Nebulization BID     PRN Meds:  perflutren lipid microspheres, cloNIDine, GI cocktail, sodium chloride flush, magnesium hydroxide, ondansetron      PHYSICAL EXAM:  Vital Signs: /80   Pulse 89   Temp 97.7 °F (36.5 °C) (Oral)   Resp 18   Ht 5' 8\" (1.727 m)   Wt 190 lb 7.6 oz (86.4 kg)   SpO2 (!) 87%   BMI 28.96 kg/m²      Gen:   No distress. Breathing comfortably at rest.   ENT:   Nasal cannula in place. Neck:   Trachea is midline. No JVD. Resp:   No accessory muscle use. Bilateral wheezing. CV:   PMI is normal. No murmur or gallop. GI:   Soft and not distended. No tenderness. M/S:  No joint swelling or tenderness in RUE, LUE, RLE, or LLE. No edema. Neuro:  Awake and alert. Normal muscle tone. No tremor.        LAB RESULTS:  CBC:   Recent Labs      10/11/18   1323  10/12/18   0439  10/13/18   0421   WBC  13.0*  9.4  17.0*   HGB  14.8  14.0  14.3   HCT

## 2018-10-13 NOTE — PROGRESS NOTES
weak and SOB and hence squad was called and pt was brought to the ER. ABG revealed hypercapnia and hence pt placed on BIPAP and admitted. Was very anxious on the BIPAP and received ativan and pt very lethargic at present\"(per note Dr Carolina Soto 10/11/18)  Family / Caregiver Present: No  Referring Practitioner: Carolina Soto  Diagnosis: hypercapnia  Pain Assessment  Patient Currently in Pain: No  Pain Assessment: 0-10  Pain Level: 0  RASS Score (Ventilated): Alert and calm  Oxygen Therapy  SpO2: (!) 87 %  Pulse Oximeter Device Mode: Continuous  Pulse Oximeter Device Location: Right;Finger  O2 Device: High flow nasal cannula  Skin Protection for O2 Device: No  O2 Flow Rate (L/min): 15 L/min  Blood Gas  Performed?: No  Social/Functional History  Social/Functional History  Lives With: Spouse  Type of Home: House  Home Layout: One level  Home Access: Stairs to enter with rails  Entrance Stairs - Number of Steps: 1 nery   Bathroom Shower/Tub: Tub/Shower unit (Simultaneous filing. User may not have seen previous data.)  Bathroom Toilet: Handicap height (Simultaneous filing. User may not have seen previous data.)  ADL Assistance: Independent  Homemaking Assistance: Independent (Simultaneous filing.  User may not have seen previous data.)  Homemaking Responsibilities: Yes  Ambulation Assistance: Independent  Transfer Assistance: Independent  Additional Comments: Pt stated he is indep with IADL's, was recently working on the roof - (takes small oxygen tank with him)       Objective        Orientation  Overall Orientation Status: Within Functional Limits     Functional Mobility  Functional - Mobility Device: No device  Assist Level: Contact guard assistance  Functional Mobility Comments: couple steps from bed to recliner with light CGA, multiple lines to monitor  Wheelchair Bed Transfers  Wheelchair/Bed - Technique: Stand step  Equipment Used: Bed  Level of Asssistance: Contact guard assistance  Wheelchair Transfers Comments: assist goals: by discharge  Short term goal 1: self care indep with oxygen  Short term goal 2: transfers indep with oxygen, no AD  Short term goal 3: funcitonal mobility indep with oxygen , no AD  Short term goal 4: increased activity tolerance, use of pursed lip breathing to maintain adequate oxygen sats  Long term goals  Time Frame for Long term goals : same as stg  Patient Goals   Patient goals : \"I just want to get stronger to get out of here \"  Agreed he wanted to be able to get up and walk do his own personal care\"       Therapy Time   Individual Concurrent Group Co-treatment   Time In 0805         Time Out 0855         Minutes 3 [facility], OTR/L 770

## 2018-10-13 NOTE — PROGRESS NOTES
Hospitalist Progress Note      PCP: [facility]    Date of Admission: 10/11/2018    Subjective: used BIPAP overnight, SOB improved from yesterday, on vapotherm    Medications:  Reviewed    Infusion Medications   Scheduled Medications    albuterol  2.5 mg Nebulization Q4H WA    methylPREDNISolone  40 mg Intravenous Q12H    theophylline  400 mg Oral Daily    tamsulosin  0.4 mg Oral Daily    losartan  100 mg Oral Daily    aspirin  81 mg Oral Daily    tiotropium  18 mcg Inhalation Daily    levofloxacin  750 mg Oral Daily    pantoprazole  40 mg Intravenous BID    And    sodium chloride (PF)  10 mL Intravenous BID    sodium chloride flush  10 mL Intravenous 2 times per day    enoxaparin  40 mg Subcutaneous Daily    budesonide  0.5 mg Nebulization BID    formoterol  20 mcg Nebulization BID     PRN Meds: perflutren lipid microspheres, cloNIDine, GI cocktail, sodium chloride flush, magnesium hydroxide, ondansetron      Intake/Output Summary (Last 24 hours) at 10/13/18 1640  Last data filed at 10/13/18 1500   Gross per 24 hour   Intake              930 ml   Output              855 ml   Net               75 ml       Physical Exam Performed:    /77   Pulse 80   Temp 97.7 °F (36.5 °C) (Axillary)   Resp 25   Ht 5' 8\" (1.727 m)   Wt 190 lb 7.6 oz (86.4 kg)   SpO2 96%   BMI 28.96 kg/m²     General appearance: awake, on vapotherm, able to complete full sentence but still SOB  HEENT Normal cephalic, atraumatic without obvious deformity.  Pupils equal, round, and reactive to light.  Extra ocular muscles intact.  Conjunctivae/corneas clear. Neck: Supple, No jugular venous distention/bruits.  Trachea midline without thyromegaly or adenopathy with full range of motion.   Lungs: diminished with b/l exp wheeze  Heart: Regular rate and rhythm with Normal S1/S2   Abdomen: Soft, non-tender, distended without rigidity or guarding and positive bowel sounds  Extremities: No clubbing, cyanosis, or edema bilaterally. Skin: Skin color, texture, turgor normal.  No rashes or lesions. Neurologic: awake, oriented to self/place, grossly nonfocal  Mental status: awake  Capillary Refill: Acceptable  < 3 seconds  Peripheral Pulses: +3 Easily felt, not easily obliterated with pressure    Labs:   Recent Labs      10/11/18   1323  10/12/18   0439  10/13/18   0421   WBC  13.0*  9.4  17.0*   HGB  14.8  14.0  14.3   HCT  44.9  42.0  43.8   PLT  205  201  235     Recent Labs      10/11/18   1323  10/12/18   0439  10/13/18   0421   NA  139  139  141   K  4.5  4.8  4.4   CL  99  97*  98*   CO2  32  29  29   BUN  12  19  29*   CREATININE  0.7*  0.7*  0.9   CALCIUM  9.2  9.2  9.3   PHOS   --   4.4  4.1     Recent Labs      10/13/18   0421   AST  28   ALT  22   BILIDIR  <0.2   BILITOT  0.5   ALKPHOS  58     No results for input(s): INR in the last 72 hours. Recent Labs      10/11/18   1323   TROPONINI  <0.01       Urinalysis:    No results found for: Pooja Hark, BACTERIA, RBCUA, BLOODU, SPECGRAV, GLUCOSEU    Radiology:  XR CHEST PORTABLE   Final Result   No acute disease. XR CHEST PORTABLE   Final Result   Findings suggestive COPD. Mild asymmetric interstitial opacities are seen within the left base which   may be related to scar or fibrosis. No definite acute process. Assessment/Plan:    Active Hospital Problems    Diagnosis Date Noted    Essential hypertension [I10]     Streptococcal bacteremia [R78.81, B95.5]     COPD exacerbation ([facility] [address] [J44.1] 10/11/2018    Acute respiratory failure with hypoxia and hypercapnia (HCC) [J96.01, J96.02]     Lethargy [R53.83]          Acute hypoxic/hypercapnic resp failure - initial ABG reviewed, placed on BIPAP in ER. Now on vapotherm. cont BIPAP QHS, pulm consulted, cont O2  Acute COPD exacerbation with bronchitis- cont IV steroids/neb/inhaler/doxy, pulm consulted.    Acute metabolic encephalopathy - suspect 2/2 ativan and hypercapnia, improved  Bacteremia -

## 2018-10-13 NOTE — CONSULTS
Infectious Diseases Inpatient Consult Note      Reason for Consult:  COPD exacerbation and Positive Blood cx h/ o AVR and remote h/o splenectomy     Requesting Physician: [facility]      Primary Care Physician:  [facility]    History Obtained From:  Pt and Medical records     CHIEF COMPLAINT:     Chief Complaint   Patient presents with    Shortness of Breath     pt became SOB on Wednesday,         HISTORY OF PRESENT ILLNESS:  80 y.o. man with advanced COPD on home O2 follows at [facility] was d/c from hospital on Wednesday and now admitted on 10/11 with acute sob and wheezing and acute resp failure required BIPAP over night and now using Vapotherm+. He tells that virus PCR -Rhinovirus at [facility] and no records available. He was d/c home on prednisone taper. Blood cx here from 10/11 one of two bottles positive for Strep viridans+ and simultaneous set negative and repeat Blood cx sent today. 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          Past Medical History:    Past Medical History:   Diagnosis Date    Aortic valve replaced     COPD (chronic obstructive pulmonary disease) ([address]     Hypertension        Past Surgical History:    Past Surgical History:   Procedure Laterality Date    AORTIC VALVE REPLACEMENT      SPLENECTOMY         Current Medications:    Outpatient Prescriptions Marked as Taking for the 10/11/18 encounter [facility] Encounter)   Medication Sig Dispense Refill    albuterol (PROVENTIL) (2.5 MG/3ML) 0.083% nebulizer solution Take 2.5 mg by nebulization every 6 hours as needed for Wheezing      albuterol sulfate  (90 Base) MCG/ACT inhaler Inhale 2 puffs into the lungs every 6 hours as needed for Wheezing      aspirin EC 81 MG EC tablet Take 81 mg by mouth daily      atorvastatin (LIPITOR) 40 MG tablet Take 20 mg by mouth nightly       budesonide-formoterol (SYMBICORT) 80-4.5 MCG/ACT AERO Inhale 2 puffs into the lungs 2 times daily      losartan °C) (Oral)   Resp 20   Ht 5' 8\" (1.727 m)   Wt 190 lb 7.6 oz (86.4 kg)   SpO2 96%   BMI 28.96 kg/m²     General Appearance: alert,in  acute distress, +  pallor, no icterus chronically ill appearing man on Vapo therm+ in resp distress+   Skin: warm and dry, no rash or erythema  Head: normocephalic and atraumatic  Eyes: pupils equal, round, and reactive to light, conjunctivae normal  ENT: tympanic membrane, external ear and ear canal normal bilaterally, nose without deformity, nasal mucosa and turbinates normal without polyps  Neck: supple and non-tender without mass, no thyromegaly  no cervical lymphadenopathy  Pulmonary/Chest: coarse crepts bilaterally- ++ wheezes, rales or rhonchi, normal air movement, in  respiratory distress  Cardiovascular:l S1 and S2, loud Aortic sound+ esm+  murmurs, rubs, clicks, or gallops, no carotid bruits  Abdomen: soft, non-tender, non-distended, normal bowel sounds, no masses or organomegaly  Extremities: no cyanosis, clubbing or edema  Musculoskeletal: normal range of motion, no joint swelling, deformity or tenderness scar from previous abd surgery   Neurologic: reflexes normal and symmetric, no cranial nerve deficit  Psych:  Orientation, sensorium, mood normal  Lines:  IV          DATA:    Lab Results   Component Value Date    WBC 17.0 (H) 10/13/2018    HGB 14.3 10/13/2018    HCT 43.8 10/13/2018    MCV 96.8 10/13/2018     10/13/2018     Lab Results   Component Value Date    CREATININE 0.9 10/13/2018    BUN 29 (H) 10/13/2018     10/13/2018    K 4.4 10/13/2018    CL 98 (L) 10/13/2018    CO2 29 10/13/2018       Hepatic Function Panel:   Lab Results   Component Value Date    ALKPHOS 58 10/13/2018    ALT 22 10/13/2018    AST 28 10/13/2018    PROT 7.3 10/13/2018    BILITOT 0.5 10/13/2018    BILIDIR <0.2 10/13/2018    IBILI see below 10/13/2018    LABALBU 4.0 10/13/2018     UA:No results found for: Tamara Yu [place] [address], [address] [facility], [address], [facility], [facility],

## 2018-10-13 NOTE — PROGRESS NOTES
4 Eyes Skin Assessment     The patient is being assess for  Shift Handoff    I agree that 2 RN's have performed a thorough Head to Toe Skin Assessment on the patient. ALL assessment sites listed below have been assessed. Areas assessed by both nurses:   [x]   Head, Face, and Ears   [x]   Shoulders, Back, and Chest  [x]   Arms, Elbows, and Hands   [x]   Coccyx, Sacrum, and IschIum  [x]   Legs, Feet, and Heels        Does the Patient have Skin Breakdown?   NO  Andrea Prevention initiated:  Yes   Wound Care Orders initiated:  No      [facility] nurse consulted for Pressure Injury (Stage 3,4, Unstageable, DTI, NWPT, and Complex wounds), New and Established Ostomies:  No      Nurse 1 eSignature: Electronically signed by Kaley Smith RN on 10/13/18 at 7:40 AM    **SHARE this note so that the co-signing nurse is able to place an eSignature**    Nurse 2 eSignature: Electronically signed by Lowell Fischer RN on 10/13/18 at 4:37 PM

## 2018-10-13 NOTE — PLAN OF CARE
Problem: Risk for Impaired Skin Integrity  Goal: Tissue integrity - skin and mucous membranes  Structural intactness and normal physiological function of skin and  mucous membranes.    Outcome: Ongoing    Problem: Falls - Risk of:  Goal: Will remain free from falls  Will remain free from falls   Outcome: Ongoing    Goal: Absence of physical injury  Absence of physical injury   Outcome: Ongoing    Problem: Anxiety/Stress:  Goal: Level of anxiety will decrease  Level of anxiety will decrease   Outcome: Ongoing    Problem: Gas Exchange - Impaired:  Goal: Levels of oxygenation will improve  Levels of oxygenation will improve   Outcome: Ongoing

## 2018-10-14 LAB
ANION GAP SERPL CALCULATED.3IONS-SCNC: 10 MMOL/L (ref 3–16)
BASE EXCESS ARTERIAL: 3.3 MMOL/L (ref -3–3)
BASOPHILS ABSOLUTE: 0.1 K/UL (ref 0–0.2)
BASOPHILS RELATIVE PERCENT: 0.3 %
BLOOD CULTURE, ROUTINE: ABNORMAL
BUN BLDV-MCNC: 28 MG/DL (ref 7–20)
C-REACTIVE PROTEIN: 5 MG/L (ref 0–5.1)
CALCIUM SERPL-MCNC: 8.7 MG/DL (ref 8.3–10.6)
CARBOXYHEMOGLOBIN ARTERIAL: 0.7 % (ref 0–1.5)
CHLORIDE BLD-SCNC: 97 MMOL/L (ref 99–110)
CO2: 30 MMOL/L (ref 21–32)
CREAT SERPL-MCNC: 0.8 MG/DL (ref 0.8–1.3)
EOSINOPHILS ABSOLUTE: 0 K/UL (ref 0–0.6)
EOSINOPHILS RELATIVE PERCENT: 0 %
GFR AFRICAN AMERICAN: >60
GFR NON-AFRICAN AMERICAN: >60
GLUCOSE BLD-MCNC: 112 MG/DL (ref 70–99)
HCO3 ARTERIAL: 30.2 MMOL/L (ref 21–29)
HCT VFR BLD CALC: 40.4 % (ref 40.5–52.5)
HEMOGLOBIN, ART, EXTENDED: 14.9 G/DL (ref 13.5–17.5)
HEMOGLOBIN: 13.4 G/DL (ref 13.5–17.5)
LYMPHOCYTES ABSOLUTE: 0.6 K/UL (ref 1–5.1)
LYMPHOCYTES RELATIVE PERCENT: 3.2 %
MAGNESIUM: 2.4 MG/DL (ref 1.8–2.4)
MCH RBC QN AUTO: 32 PG (ref 26–34)
MCHC RBC AUTO-ENTMCNC: 33.1 G/DL (ref 31–36)
MCV RBC AUTO: 96.5 FL (ref 80–100)
METHEMOGLOBIN ARTERIAL: 0.7 %
MONOCYTES ABSOLUTE: 1.1 K/UL (ref 0–1.3)
MONOCYTES RELATIVE PERCENT: 6.3 %
NEUTROPHILS ABSOLUTE: 15.9 K/UL (ref 1.7–7.7)
NEUTROPHILS RELATIVE PERCENT: 90.2 %
O2 CONTENT ARTERIAL: 19 ML/DL
O2 SAT, ARTERIAL: 93.7 %
O2 THERAPY: ABNORMAL
ORGANISM: ABNORMAL
ORGANISM: ABNORMAL
PCO2 ARTERIAL: 57.2 MMHG (ref 35–45)
PDW BLD-RTO: 15.2 % (ref 12.4–15.4)
PH ARTERIAL: 7.34 (ref 7.35–7.45)
PHOSPHORUS: 3.9 MG/DL (ref 2.5–4.9)
PLATELET # BLD: 217 K/UL (ref 135–450)
PMV BLD AUTO: 10.1 FL (ref 5–10.5)
PO2 ARTERIAL: 72.1 MMHG (ref 75–108)
POTASSIUM SERPL-SCNC: 4.6 MMOL/L (ref 3.5–5.1)
RBC # BLD: 4.19 M/UL (ref 4.2–5.9)
SEDIMENTATION RATE, ERYTHROCYTE: 10 MM/HR (ref 0–20)
SODIUM BLD-SCNC: 137 MMOL/L (ref 136–145)
TCO2 ARTERIAL: 31.9 MMOL/L
WBC # BLD: 17.7 K/UL (ref 4–11)

## 2018-10-14 PROCEDURE — 6360000002 HC RX W HCPCS: Performed by: INTERNAL MEDICINE

## 2018-10-14 PROCEDURE — 2000000000 HC ICU R&B

## 2018-10-14 PROCEDURE — 94762 N-INVAS EAR/PLS OXIMTRY CONT: CPT

## 2018-10-14 PROCEDURE — 84100 ASSAY OF PHOSPHORUS: CPT

## 2018-10-14 PROCEDURE — 86140 C-REACTIVE PROTEIN: CPT

## 2018-10-14 PROCEDURE — 6370000000 HC RX 637 (ALT 250 FOR IP): Performed by: INTERNAL MEDICINE

## 2018-10-14 PROCEDURE — 2580000003 HC RX 258: Performed by: INTERNAL MEDICINE

## 2018-10-14 PROCEDURE — 6360000002 HC RX W HCPCS: Performed by: NURSE PRACTITIONER

## 2018-10-14 PROCEDURE — 83735 ASSAY OF MAGNESIUM: CPT

## 2018-10-14 PROCEDURE — 94640 AIRWAY INHALATION TREATMENT: CPT

## 2018-10-14 PROCEDURE — 99233 SBSQ HOSP IP/OBS HIGH 50: CPT | Performed by: INTERNAL MEDICINE

## 2018-10-14 PROCEDURE — 36415 COLL VENOUS BLD VENIPUNCTURE: CPT

## 2018-10-14 PROCEDURE — 2700000000 HC OXYGEN THERAPY PER DAY

## 2018-10-14 PROCEDURE — 94660 CPAP INITIATION&MGMT: CPT

## 2018-10-14 PROCEDURE — 85652 RBC SED RATE AUTOMATED: CPT

## 2018-10-14 PROCEDURE — 36600 WITHDRAWAL OF ARTERIAL BLOOD: CPT

## 2018-10-14 PROCEDURE — 82803 BLOOD GASES ANY COMBINATION: CPT

## 2018-10-14 PROCEDURE — 80048 BASIC METABOLIC PNL TOTAL CA: CPT

## 2018-10-14 PROCEDURE — 85025 COMPLETE CBC W/AUTO DIFF WBC: CPT

## 2018-10-14 PROCEDURE — C9113 INJ PANTOPRAZOLE SODIUM, VIA: HCPCS | Performed by: INTERNAL MEDICINE

## 2018-10-14 RX ORDER — LORAZEPAM 2 MG/ML
1 INJECTION INTRAMUSCULAR ONCE
Status: COMPLETED | OUTPATIENT
Start: 2018-10-14 | End: 2018-10-14

## 2018-10-14 RX ORDER — LORAZEPAM 2 MG/ML
INJECTION INTRAMUSCULAR
Status: DISCONTINUED
Start: 2018-10-14 | End: 2018-10-15

## 2018-10-14 RX ORDER — ALBUTEROL SULFATE 2.5 MG/3ML
2.5 SOLUTION RESPIRATORY (INHALATION) EVERY 4 HOURS PRN
Status: DISCONTINUED | OUTPATIENT
Start: 2018-10-14 | End: 2018-10-21 | Stop reason: HOSPADM

## 2018-10-14 RX ADMIN — TIOTROPIUM BROMIDE 18 MCG: 18 CAPSULE ORAL; RESPIRATORY (INHALATION) at 08:14

## 2018-10-14 RX ADMIN — FORMOTEROL FUMARATE DIHYDRATE 20 MCG: 20 SOLUTION RESPIRATORY (INHALATION) at 20:44

## 2018-10-14 RX ADMIN — ALBUTEROL SULFATE 2.5 MG: 2.5 SOLUTION RESPIRATORY (INHALATION) at 12:00

## 2018-10-14 RX ADMIN — CEFTRIAXONE SODIUM 2 G: 2 INJECTION, POWDER, FOR SOLUTION INTRAMUSCULAR; INTRAVENOUS at 08:33

## 2018-10-14 RX ADMIN — ALBUTEROL SULFATE 2.5 MG: 2.5 SOLUTION RESPIRATORY (INHALATION) at 08:14

## 2018-10-14 RX ADMIN — ENOXAPARIN SODIUM 40 MG: 40 INJECTION SUBCUTANEOUS at 08:33

## 2018-10-14 RX ADMIN — ALBUTEROL SULFATE 2.5 MG: 2.5 SOLUTION RESPIRATORY (INHALATION) at 16:34

## 2018-10-14 RX ADMIN — LOSARTAN POTASSIUM 100 MG: 50 TABLET ORAL at 08:33

## 2018-10-14 RX ADMIN — TAMSULOSIN HYDROCHLORIDE 0.4 MG: 0.4 CAPSULE ORAL at 08:33

## 2018-10-14 RX ADMIN — THEOPHYLLINE 400 MG: 400 TABLET, EXTENDED RELEASE ORAL at 08:33

## 2018-10-14 RX ADMIN — Medication 10 ML: at 08:34

## 2018-10-14 RX ADMIN — PANTOPRAZOLE SODIUM 40 MG: 40 INJECTION, POWDER, FOR SOLUTION INTRAVENOUS at 08:35

## 2018-10-14 RX ADMIN — BUDESONIDE 500 MCG: 0.5 SUSPENSION RESPIRATORY (INHALATION) at 20:45

## 2018-10-14 RX ADMIN — BUDESONIDE 500 MCG: 0.5 SUSPENSION RESPIRATORY (INHALATION) at 08:14

## 2018-10-14 RX ADMIN — ASPIRIN 81 MG: 81 TABLET, COATED ORAL at 08:33

## 2018-10-14 RX ADMIN — ALBUTEROL SULFATE 2.5 MG: 2.5 SOLUTION RESPIRATORY (INHALATION) at 20:45

## 2018-10-14 RX ADMIN — Medication 10 ML: at 20:26

## 2018-10-14 RX ADMIN — METHYLPREDNISOLONE SODIUM SUCCINATE 40 MG: 40 INJECTION, POWDER, FOR SOLUTION INTRAMUSCULAR; INTRAVENOUS at 18:48

## 2018-10-14 RX ADMIN — FORMOTEROL FUMARATE DIHYDRATE 20 MCG: 20 SOLUTION RESPIRATORY (INHALATION) at 08:14

## 2018-10-14 RX ADMIN — Medication 10 ML: at 20:28

## 2018-10-14 RX ADMIN — LORAZEPAM 1 MG: 2 INJECTION INTRAMUSCULAR; INTRAVENOUS at 18:44

## 2018-10-14 RX ADMIN — PANTOPRAZOLE SODIUM 40 MG: 40 INJECTION, POWDER, FOR SOLUTION INTRAVENOUS at 20:26

## 2018-10-14 RX ADMIN — METHYLPREDNISOLONE SODIUM SUCCINATE 40 MG: 40 INJECTION, POWDER, FOR SOLUTION INTRAMUSCULAR; INTRAVENOUS at 05:58

## 2018-10-14 ASSESSMENT — PAIN SCALES - GENERAL
PAINLEVEL_OUTOF10: 0

## 2018-10-14 ASSESSMENT — PULMONARY FUNCTION TESTS: PEFR_L/MIN: 32

## 2018-10-14 NOTE — PLAN OF CARE
Problem: Risk for Impaired Skin Integrity  Goal: Tissue integrity - skin and mucous membranes  Structural intactness and normal physiological function of skin and  mucous membranes. Outcome: Ongoing  Reposition was performed every two hours and PRN basis. Skin care was performed. No signs of new skin injury was noted. Problem: Falls - Risk of:  Goal: Will remain free from falls  Will remain free from falls   Outcome: Ongoing  Side rails up x 3. Bed locked and low position. Falling star program remains in place. Call light and personal belongings within reach. Frequent visual monitoring continues. Toileting program inplace. Patient assisted in turning/repositioning at least once every 2 hours, and on a prn basis. Problem: Gas Exchange - Impaired:  Goal: Levels of oxygenation will improve  Levels of oxygenation will improve   Outcome: Ongoing  Patient on Vapotherm keeping O2 sat greater than 92%. Problem: Nutrition Deficit:  Goal: Ability to achieve adequate nutritional intake will improve  Ability to achieve adequate nutritional intake will improve   Outcome: Ongoing  Patient able to eat his dinner tray 75% tolerating well.

## 2018-10-14 NOTE — PROGRESS NOTES
4 Eyes Skin Assessment     The patient is being assess for  Shift Handoff    I agree that 2 RN's have performed a thorough Head to Toe Skin Assessment on the patient. ALL assessment sites listed below have been assessed. Areas assessed by both nurses:  [x]   Head, Face, and Ears   [x]   Shoulders, Back, and Chest  [x]   Arms, Elbows, and Hands   [x]   Coccyx, Sacrum, and IschIum  [x]   Legs, Feet, and Heels        Does the Patient have Skin Breakdown?   No         Andrea Prevention initiated:  YES  Wound Care Orders initiated:  NO      [facility] nurse consulted for Pressure Injury (Stage 3,4, Unstageable, DTI, NWPT, and Complex wounds), New and Established Ostomies:  NO     Nurse 1 eSignature: Electronically signed by Nayan Sanford RN on 10/14/18 at 7:51 PM    **SHARE this note so that the co-signing nurse is able to place an eSignature**    Nurse 2 eSignature: Electronically signed by Prema Moreno RN on 10/14/18 at 8:06 PM

## 2018-10-14 NOTE — PROGRESS NOTES
Infectious Disease Follow up Notes  Admit Date: 10/11/2018  Hospital Day: 4    Antibiotics : IV Ceftriaxone     CHIEF COMPLAINT:     Sob  Wheeze  resp failure  Positive Blood cx    Subjective interval History :  80 y.o. man with advanced COPD on home O2 follows at [place] was d/c from hospital on Wednesday and now admitted on 10/11 with acute sob and wheezing and acute resp failure required BIPAP over night and now using Vapotherm+. He tells that virus PCR -Rhinovirus at [place] and no records available. He was d/c home on prednisone taper. Blood cx here from 10/11 one of two bottles positive for Strep viridans+ and simultaneous set negative and repeat Blood cx sent today. 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     Sitting up in the chair remains sob+ on vapotherm in ICU and family at bed side+ cough+ wheeze+ no chills tolerating IV abx ok        Past Medical History:    Past Medical History:   Diagnosis Date    Aortic valve replaced     COPD (chronic obstructive pulmonary disease) ([address]     Hypertension        Past Surgical History:    Past Surgical History:   Procedure Laterality Date    AORTIC VALVE REPLACEMENT      SPLENECTOMY         Current Medications:    Outpatient Prescriptions Marked as Taking for the 10/11/18 encounter [facility] HOSPITAL Encounter)   Medication Sig Dispense Refill    albuterol (PROVENTIL) (2.5 MG/3ML) 0.083% nebulizer solution Take 2.5 mg by nebulization every 6 hours as needed for Wheezing      albuterol sulfate  (90 Base) MCG/ACT inhaler Inhale 2 puffs into the lungs every 6 hours as needed for Wheezing      aspirin EC 81 MG EC tablet Take 81 mg by mouth daily      atorvastatin (LIPITOR) 40 MG tablet Take 20 mg by mouth nightly       budesonide-formoterol (SYMBICORT) 80-4.5 MCG/ACT AERO Inhale 2 puffs into the lungs 2 times daily      losartan (COZAAR) 100 MG tablet Take 50 Beth Bass, URINETYPE   Urine Microscopic: No results found for: LABCAST, BACTERIA, COMU, HYALCAST, WBCUA, RBCUA, EPIU    MICRO: cultures reviewed and updated by me          Culture Blood #1 [[id number]] Collected: 10/13/18 1042   Order Status: Completed Specimen: Blood from Blood Updated: 10/14/18 1215    Blood Culture, Routine No Growth to date.  Any change in status will be called. Narrative:     ORDER#: [id number]                          ORDERED BY: Pavel Bradley  SOURCE: Blood Hand, Left                   COLLECTED:  10/13/18 10:42  ANTIBIOTICS AT [facility]ORD[facility]. :                      RECEIVED :  10/13/18 10:58  If child <=2 yrs old please draw pediatric bottle. ~Blood Culture #1  Performed at:  [facility]  [address] [place] [employer] [address]   Phone [phone]   Culture Blood #2 [[id number]] Collected: 10/13/18 1046   Order Status: Completed Specimen: Blood Updated: 10/14/18 1215    Culture, Blood 2 No Growth to date.  Any change in status will be called. Narrative:     ORDER#: [id number]                          ORDERED BY: Pavel Bradley  SOURCE: Blood Antecubital-Right            COLLECTED:  10/13/18 10:46  ANTIBIOTICS AT JORDAN. :                      RECEIVED :  10/13/18 10:57  If child <=2 yrs old please draw pediatric bottle. ~Blood Culture #2  Performed at:  [facility]  [address], [place] [employer] [address]   Phone [phone]   Culture Blood #1 [[id number]] (Abnormal) Collected: 10/11/18 1323   Order Status: Completed Specimen: Blood from Blood Updated: 10/14/18 0846    Blood Culture, Routine --    Organism Streptococcus species DNA Detected (A)    Blood Culture, Routine See additional report for complete BCID panel. Organism Streptococcus viridans group (A)    Blood Culture, Routine --    POSITIVE for   No further workup   Isolated one out of two bottles   This organism is only isolated from one bottle. Susceptibility testing is not routinely performed. Additional testing can be ordered by calling the   Microbiology Department within 30 days. Additional blood cultures may be obtained if   clinical suspicion of septicemia is high. Narrative:     ORDER#: [id number]                          ORDERED BY: EFREM Barnes  SOURCE: Blood Hand, Left                   COLLECTED:  10/11/18 13:23  ANTIBIOTICS AT JORDAN. :                      RECEIVED :  10/11/18 13:29  CALL  Arshad  [id number] tel. [phone],  Microbiology results called to and read back by Aminata Parker RN, 10/12/2018  10:32, by Doris Isaac  If child <=2 yrs old please draw pediatric bottle. ~Blood Culture #1  Performed at:  [facility]  [address] [facility] [place] [address]   Phone [phone]   Culture Blood #2 [[id number]] Collected: 10/11/18 1844   Order Status: Completed Specimen: Blood Updated: 10/12/18 2215    Culture, Blood 2 No Growth to date.  Any change in status will be called. Narrative:     ORDER#: [id number]                          ORDERED BY: EFREM LEONG  SOURCE: Blood left arm                     COLLECTED:  10/11/18 18:44  ANTIBIOTICS AT JORDAN. :                      RECEIVED :  10/11/18 18:51  If child <=2 yrs old please draw pediatric bottle. ~Blood Culture #2  Performed at:  [facility]  [address] [facility] [place] [address]   Phone [phone]   Culture, Blood PCR Report [[id number]] Collected: 10/11/18 1323   Order Status: Completed Updated: 10/12/18 1035    Report SEE IMAGE   Narrative:     CALL Sharon Verduzco [phone],  Microbiology results called to and read back by Aminata Parker RN, 10/12/2018  10:32, by Doris Isaac  Referred out by:  [address] [place] [address]   Phone [phone]           IMAGING:    XR CHEST PORTABLE   Final Result   No acute disease.          XR CHEST PORTABLE   Final Result

## 2018-10-14 NOTE — PROGRESS NOTES
sounds  Extremities: No clubbing, cyanosis, or edema bilaterally. Skin: Skin color, texture, turgor normal.  No rashes or lesions. Neurologic: awake, oriented to self/place, grossly nonfocal  Mental status: awake  Capillary Refill: Acceptable  < 3 seconds  Peripheral Pulses: +3 Easily felt, not easily obliterated with pressure    Labs:   Recent Labs      10/12/18   0439  10/13/18   0421  10/14/18   0429   WBC  9.4  17.0*  17.7*   HGB  14.0  14.3  13.4*   HCT  42.0  43.8  40.4*   PLT  201  235  217     Recent Labs      10/12/18   0439  10/13/18   0421  10/14/18   0429   NA  139  141  137   K  4.8  4.4  4.6   CL  97*  98*  97*   CO2  29  29  30   BUN  19  29*  28*   CREATININE  0.7*  0.9  0.8   CALCIUM  9.2  9.3  8.7   PHOS  4.4  4.1  3.9     Recent Labs      10/13/18   0421   AST  28   ALT  22   BILIDIR  <0.2   BILITOT  0.5   ALKPHOS  58     No results for input(s): INR in the last 72 hours. No results for input(s): Lord Rehana in the last 72 hours. Urinalysis:    No results found for: Dominguez Aracelis, BACTERIA, RBCUA, BLOODU, SPECGRAV, Tamara [address]    Radiology:  XR CHEST PORTABLE   Final Result   No acute disease. XR CHEST PORTABLE   Final Result   Findings suggestive COPD. Mild asymmetric interstitial opacities are seen within the left base which   may be related to scar or fibrosis. No definite acute process. Assessment/Plan:    Active Hospital Problems    Diagnosis Date Noted    H/O splenectomy [Z90.81]     Positive blood culture [R78.81]     Essential hypertension [I10]     Streptococcal bacteremia [R78.81, B95.5]     COPD exacerbation (HCC) [J44.1] 10/11/2018    Acute respiratory failure with hypoxia and hypercapnia (HCC) [J96.01, J96.02]     Lethargy [R53.83]          Acute hypoxic/hypercapnic resp failure - initial ABG reviewed, placed on BIPAP in ER. Now on vapotherm.  cont BIPAP QHS, pulm consulted, cont O2  Acute COPD exacerbation with bronchitis- cont IV

## 2018-10-14 NOTE — PROGRESS NOTES
[phone]  Patient with increased heart rate, and increased work of breathing. Message sent to Dr. Reyna Calle. Awaiting return call. Luigi Castillo 91  Return call from Dr. Reyna Calle. See new orders. Instructed to place patient back on bipap. 1830  Patient placed back on bipap. Having anxiety. Attempted to remove bipap at one point. Message sent to Dr. Reyna Calle. Awaiting call back. TABATHA Rodgers 70  Return call from Dr. Reyna Calle. See new order for one time dose of ativan. 7614  Ativan administered.

## 2018-10-14 NOTE — PROGRESS NOTES
4.4  4.1  3.9   GLUCOSE  121*  149*  112*     LIVER PROFILE:   Recent Labs      10/13/18   0421   AST  28   ALT  22   BILIDIR  <0.2   BILITOT  0.5   ALKPHOS  58     ABG:   Recent Labs      10/11/18   1325  10/11/18   1602   PHART  7.246*  7.312*   QZG2ZOD  76.8*  60.1*   PO2ART  193.0*  95.6       Results for Corinne Cullen (MRN [id number]) as of 10/13/2018 10:04   Ref. Range 10/12/2018 04:39 10/13/2018 04:21   Procalcitonin Latest Ref Range: 0.00 - 0.15 ng/mL 0.06 0.05       Results for Corinne Cullen (MRN [id number]) as of 10/14/2018 11:04   Ref. Range 10/14/2018 04:29   Sed Rate Latest Ref Range: 0 - 20 mm/Hr 10       Results for Corinne Cullen (MRN [id number]) as of 10/14/2018 11:04   Ref. Range 10/14/2018 04:29   CRP Latest Ref Range: 0.0 - 5.1 mg/L 5.0       CULTURES:  Blood Cultures 10/11/18:  Streptococcal viridans      CHEST XRAY 10/13/18: The lungs are clear.  The costophrenic angles are sharp.  The   cardiomediastinal silhouette is within normal limits.  Sternal wires are   again noted.  There is no discernible pneumothorax.  Degenerative changes are   again seen in both shoulders.

## 2018-10-15 ENCOUNTER — APPOINTMENT (OUTPATIENT)
Dept: CT IMAGING | Age: 68
DRG: 189 | End: 2018-10-15
Payer: MEDICARE

## 2018-10-15 LAB
ANION GAP SERPL CALCULATED.3IONS-SCNC: 9 MMOL/L (ref 3–16)
BASOPHILS ABSOLUTE: 0 K/UL (ref 0–0.2)
BASOPHILS RELATIVE PERCENT: 0.1 %
BUN BLDV-MCNC: 28 MG/DL (ref 7–20)
CALCIUM SERPL-MCNC: 8.8 MG/DL (ref 8.3–10.6)
CHLORIDE BLD-SCNC: 98 MMOL/L (ref 99–110)
CO2: 32 MMOL/L (ref 21–32)
CREAT SERPL-MCNC: 0.9 MG/DL (ref 0.8–1.3)
EOSINOPHILS ABSOLUTE: 0 K/UL (ref 0–0.6)
EOSINOPHILS RELATIVE PERCENT: 0 %
GFR AFRICAN AMERICAN: >60
GFR NON-AFRICAN AMERICAN: >60
GLUCOSE BLD-MCNC: 123 MG/DL (ref 70–99)
HCT VFR BLD CALC: 41.5 % (ref 40.5–52.5)
HEMOGLOBIN: 13.5 G/DL (ref 13.5–17.5)
LYMPHOCYTES ABSOLUTE: 0.4 K/UL (ref 1–5.1)
LYMPHOCYTES RELATIVE PERCENT: 3.2 %
MAGNESIUM: 2.5 MG/DL (ref 1.8–2.4)
MCH RBC QN AUTO: 31.1 PG (ref 26–34)
MCHC RBC AUTO-ENTMCNC: 32.5 G/DL (ref 31–36)
MCV RBC AUTO: 95.7 FL (ref 80–100)
MONOCYTES ABSOLUTE: 0.8 K/UL (ref 0–1.3)
MONOCYTES RELATIVE PERCENT: 5.9 %
NEUTROPHILS ABSOLUTE: 12.7 K/UL (ref 1.7–7.7)
NEUTROPHILS RELATIVE PERCENT: 90.8 %
PDW BLD-RTO: 14.9 % (ref 12.4–15.4)
PHOSPHORUS: 3.9 MG/DL (ref 2.5–4.9)
PLATELET # BLD: 211 K/UL (ref 135–450)
PMV BLD AUTO: 9.8 FL (ref 5–10.5)
POTASSIUM SERPL-SCNC: 5.1 MMOL/L (ref 3.5–5.1)
RBC # BLD: 4.33 M/UL (ref 4.2–5.9)
SODIUM BLD-SCNC: 139 MMOL/L (ref 136–145)
THEOPHYLLINE LEVEL: 10 UG/ML (ref 8–20)
WBC # BLD: 14 K/UL (ref 4–11)

## 2018-10-15 PROCEDURE — 2580000003 HC RX 258: Performed by: INTERNAL MEDICINE

## 2018-10-15 PROCEDURE — 84100 ASSAY OF PHOSPHORUS: CPT

## 2018-10-15 PROCEDURE — 85025 COMPLETE CBC W/AUTO DIFF WBC: CPT

## 2018-10-15 PROCEDURE — 97530 THERAPEUTIC ACTIVITIES: CPT

## 2018-10-15 PROCEDURE — 99233 SBSQ HOSP IP/OBS HIGH 50: CPT | Performed by: INTERNAL MEDICINE

## 2018-10-15 PROCEDURE — 6360000002 HC RX W HCPCS: Performed by: INTERNAL MEDICINE

## 2018-10-15 PROCEDURE — 80198 ASSAY OF THEOPHYLLINE: CPT

## 2018-10-15 PROCEDURE — 6360000002 HC RX W HCPCS: Performed by: NURSE PRACTITIONER

## 2018-10-15 PROCEDURE — 2700000000 HC OXYGEN THERAPY PER DAY

## 2018-10-15 PROCEDURE — 6360000004 HC RX CONTRAST MEDICATION: Performed by: INTERNAL MEDICINE

## 2018-10-15 PROCEDURE — 83735 ASSAY OF MAGNESIUM: CPT

## 2018-10-15 PROCEDURE — 97535 SELF CARE MNGMENT TRAINING: CPT

## 2018-10-15 PROCEDURE — 71260 CT THORAX DX C+: CPT

## 2018-10-15 PROCEDURE — 94762 N-INVAS EAR/PLS OXIMTRY CONT: CPT

## 2018-10-15 PROCEDURE — 36415 COLL VENOUS BLD VENIPUNCTURE: CPT

## 2018-10-15 PROCEDURE — 80048 BASIC METABOLIC PNL TOTAL CA: CPT

## 2018-10-15 PROCEDURE — 94660 CPAP INITIATION&MGMT: CPT

## 2018-10-15 PROCEDURE — 94640 AIRWAY INHALATION TREATMENT: CPT

## 2018-10-15 PROCEDURE — 6370000000 HC RX 637 (ALT 250 FOR IP): Performed by: INTERNAL MEDICINE

## 2018-10-15 PROCEDURE — C9113 INJ PANTOPRAZOLE SODIUM, VIA: HCPCS | Performed by: INTERNAL MEDICINE

## 2018-10-15 PROCEDURE — 2000000000 HC ICU R&B

## 2018-10-15 PROCEDURE — 87449 NOS EACH ORGANISM AG IA: CPT

## 2018-10-15 RX ORDER — BUDESONIDE 0.5 MG/2ML
1 INHALANT ORAL 2 TIMES DAILY
Status: DISCONTINUED | OUTPATIENT
Start: 2018-10-15 | End: 2018-10-21 | Stop reason: HOSPADM

## 2018-10-15 RX ADMIN — ALBUTEROL SULFATE 2.5 MG: 2.5 SOLUTION RESPIRATORY (INHALATION) at 16:44

## 2018-10-15 RX ADMIN — Medication 10 ML: at 08:27

## 2018-10-15 RX ADMIN — THEOPHYLLINE 400 MG: 400 TABLET, EXTENDED RELEASE ORAL at 08:26

## 2018-10-15 RX ADMIN — BUDESONIDE 1000 MCG: 0.5 SUSPENSION RESPIRATORY (INHALATION) at 19:34

## 2018-10-15 RX ADMIN — FORMOTEROL FUMARATE DIHYDRATE 20 MCG: 20 SOLUTION RESPIRATORY (INHALATION) at 08:14

## 2018-10-15 RX ADMIN — CEFTRIAXONE SODIUM 2 G: 2 INJECTION, POWDER, FOR SOLUTION INTRAMUSCULAR; INTRAVENOUS at 08:27

## 2018-10-15 RX ADMIN — METHYLPREDNISOLONE SODIUM SUCCINATE 40 MG: 40 INJECTION, POWDER, FOR SOLUTION INTRAMUSCULAR; INTRAVENOUS at 06:02

## 2018-10-15 RX ADMIN — PANTOPRAZOLE SODIUM 40 MG: 40 INJECTION, POWDER, FOR SOLUTION INTRAVENOUS at 08:26

## 2018-10-15 RX ADMIN — ENOXAPARIN SODIUM 40 MG: 40 INJECTION SUBCUTANEOUS at 08:26

## 2018-10-15 RX ADMIN — ALBUTEROL SULFATE 2.5 MG: 2.5 SOLUTION RESPIRATORY (INHALATION) at 08:14

## 2018-10-15 RX ADMIN — ALBUTEROL SULFATE 2.5 MG: 2.5 SOLUTION RESPIRATORY (INHALATION) at 12:14

## 2018-10-15 RX ADMIN — TIOTROPIUM BROMIDE 18 MCG: 18 CAPSULE ORAL; RESPIRATORY (INHALATION) at 08:14

## 2018-10-15 RX ADMIN — Medication 10 ML: at 20:41

## 2018-10-15 RX ADMIN — LOSARTAN POTASSIUM 100 MG: 50 TABLET ORAL at 08:26

## 2018-10-15 RX ADMIN — BUDESONIDE 500 MCG: 0.5 SUSPENSION RESPIRATORY (INHALATION) at 08:14

## 2018-10-15 RX ADMIN — ALBUTEROL SULFATE 2.5 MG: 2.5 SOLUTION RESPIRATORY (INHALATION) at 19:35

## 2018-10-15 RX ADMIN — PANTOPRAZOLE SODIUM 40 MG: 40 INJECTION, POWDER, FOR SOLUTION INTRAVENOUS at 20:41

## 2018-10-15 RX ADMIN — METHYLPREDNISOLONE SODIUM SUCCINATE 40 MG: 40 INJECTION, POWDER, FOR SOLUTION INTRAMUSCULAR; INTRAVENOUS at 17:56

## 2018-10-15 RX ADMIN — FORMOTEROL FUMARATE DIHYDRATE 20 MCG: 20 SOLUTION RESPIRATORY (INHALATION) at 19:34

## 2018-10-15 RX ADMIN — IOPAMIDOL 75 ML: 755 INJECTION, SOLUTION INTRAVENOUS at 10:03

## 2018-10-15 RX ADMIN — ASPIRIN 81 MG: 81 TABLET, COATED ORAL at 08:26

## 2018-10-15 RX ADMIN — TAMSULOSIN HYDROCHLORIDE 0.4 MG: 0.4 CAPSULE ORAL at 08:26

## 2018-10-15 ASSESSMENT — PAIN SCALES - GENERAL
PAINLEVEL_OUTOF10: 0

## 2018-10-15 NOTE — PROGRESS NOTES
for: Chester Renetta, GLUCOSEU, BILIRUBINUR, KETUA, SPECGRAV, BLOODU, PHUR, PROTEINU, UROBILINOGEN, NITRU, LEUKOCYTESUR, LABMICR, URINETYPE   Urine Microscopic: No results found for: LABCAST, BACTERIA, COMU, HYALCAST, WBCUA, RBCUA, EPIU    MICRO: cultures reviewed and updated by me          Culture Blood #1 [[id number]] Collected: 10/13/18 1042   Order Status: Completed Specimen: Blood from Blood Updated: 10/14/18 1215    Blood Culture, Routine No Growth to date.  Any change in status will be called. Narrative:     ORDER#: [id number]                          ORDERED BY: Helene Lancaster  SOURCE: Blood Hand, Left                   COLLECTED:  10/13/18 10:42  ANTIBIOTICS AT JORDAN. :                      RECEIVED :  10/13/18 10:58  If child <=2 yrs old please draw pediatric bottle. ~Blood Culture #1  Performed at:  [facility]  [address] [place] [place] [employer] [address]   Phone [phone]   Culture Blood #2 [[id number]] Collected: 10/13/18 1046   Order Status: Completed Specimen: Blood Updated: 10/14/18 1215    Culture, Blood 2 No Growth to date.  Any change in status will be called. Narrative:     ORDER#: [id number]                          ORDERED BY: Helene Lancaster  SOURCE: Blood Antecubital-Right            COLLECTED:  10/13/18 10:46  ANTIBIOTICS AT JORDAN. :                      RECEIVED :  10/13/18 10:57  If child <=2 yrs old please draw pediatric bottle. ~Blood Culture #2  Performed at:  [facility]  [address] [place] [address]   Phone [phone]   Culture Blood #1 [[id number]] (Abnormal) Collected: 10/11/18 1323   Order Status: Completed Specimen: Blood from Blood Updated: 10/14/18 0846    Blood Culture, Routine --    Organism Streptococcus species DNA Detected (A)    Blood Culture, Routine See additional report for complete BCID panel.     Organism Streptococcus viridans group (A)    Blood Culture, Routine --    POSITIVE for   No IV Ceftriaxone xAS blood cx remain negative   2. Blood cx x 2 sets follow up -e   3. Echo TTE -ve   4. Check ESR, CRP normal   5. Noted Pro calcitonin normal   6. Cont supportive care   7. Check Urine legionella and Pneumococcal antigen to be thorough   8. ID will follow peripherally available for any questions ? Discussed with patient/Family d/w RN    Thanks for allowing me to participate in your patient's care and please call me with any questions or concerns.     Elsie Muro MD  Infectious Disease  [facility]) Physician  Phone: [phone]   Fax : [phone]

## 2018-10-15 NOTE — PROGRESS NOTES
Exacerbation  TECHNOLOGIST PROVIDED HISTORY:  Reason for exam:->COPD Exacerbation  Ordering Physician Provided Reason for Exam: COPD Exacerbation  Acuity: Acute  Type of Exam: Subsequent/Follow-up    FINDINGS:  The lungs are clear. The costophrenic angles are sharp. The  cardiomediastinal silhouette is within normal limits. Sternal wires are  again noted. There is no discernible pneumothorax. Degenerative changes are  again seen in both shoulders. Impression No acute disease. Assessment:       COPD  Acute hypoxemic respiratory failure  2 hypercapnic respiratory failure  Blood culture positive for strep viridans 102  History of aortic valve replacement  History of splenectomy  History of blood clots in the arm, unclear if DVT or SVT      Plan:      Bacteremia  - Antibiotics per infectious disease    COPD  - Solu-Medrol 40 every 12. Increased budesonide to 1 g twice a day , continue Perforomist  - Spiriva  - Theophylline, theophylline level at goal    Acute hypoxemia  - Wean to 5 L nasal cannula at 100% with saturation of 87%. Slowly titration with poor response. Required 20 L at 100% 2 obtain saturation of 92%. consistent with  shunt physiology. With his recent hospitalization and history of blood clot, pulmonary embolism is of question. We will obtain a CTPA to further assess this. Hypertension  - Clonidine  - Holding metoprolol      High risk secondary to severe hypoxemia     This note was transcribed using [id number] [employer]. Please disregard any translational errors.       Daren Tian Pulmonary, Sleep and [facility] [phone]

## 2018-10-15 NOTE — PLAN OF CARE
Problem: Risk for Impaired Skin Integrity  Goal: Tissue integrity - skin and mucous membranes  Structural intactness and normal physiological function of skin and  mucous membranes. Outcome: Ongoing  Reposition was performed every two hours and PRN basis. Skin care was provided. No signs of new skin injury was noted. Problem: Falls - Risk of:  Goal: Will remain free from falls  Will remain free from falls   Outcome: Ongoing  Side rails up x 3. Bed locked and low position. Falling star program remains in place. Call light and personal belongings within reach. Frequent visual monitoring continues. Toileting program inplace. Patient assisted in turning/repositioning at least once every 2 hours, and on a prn basis. Problem: Anxiety/Stress:  Goal: Level of anxiety will decrease  Level of anxiety will decrease   Outcome: Ongoing  Patient was calm and relax during this shift. During day shift,patient became anxious. Problem: Gas Exchange - Impaired:  Goal: Levels of oxygenation will improve  Levels of oxygenation will improve   Outcome: Ongoing  Patient on BIPAP during this shift keeping O2 sat greater than 92%.

## 2018-10-15 NOTE — CARE COORDINATION
Met w/pt and wife to address barriers to dc. HOME: Pt lives in a 1 IAN, 1 level home with wife. Wife works. Wife states she is close to losing her job because no one is available to fill out [facility]  Paperwork. DME: has home O2 from the [place], wears 3l continuous, pt can borrow wife's walker if needed    ACTIVE SERVICES: [facility], [facility]. Pt is agreeable to [facility], choices offered and he wanted referral to [facility]. I called [phone] and left a   for their . Requested c/b with process for getting [facility] home care. TRANSPORTATION: wife to transport    PHARMACY: via [place], no problems obtaining or taking meds    PCP: Not assigned by the [place], states he is on a waiting list, left this on  to [place] [address] as well. Need to expedite.     DEMOGRAPHICS: verified address/phone number as correct    INSURANCE:  [facility]are/[facility]    HD/PD: no    THERAPY RECS: home w/hhc, no dme    F/U: await response from [place] re hhc and getting pt pcp    MARIELA will be needed at [place], RN  Case management  [phone]

## 2018-10-16 LAB
ANION GAP SERPL CALCULATED.3IONS-SCNC: 11 MMOL/L (ref 3–16)
BASOPHILS ABSOLUTE: 0 K/UL (ref 0–0.2)
BASOPHILS RELATIVE PERCENT: 0.4 %
BUN BLDV-MCNC: 28 MG/DL (ref 7–20)
CALCIUM SERPL-MCNC: 8.8 MG/DL (ref 8.3–10.6)
CHLORIDE BLD-SCNC: 102 MMOL/L (ref 99–110)
CO2: 28 MMOL/L (ref 21–32)
CREAT SERPL-MCNC: 0.9 MG/DL (ref 0.8–1.3)
CULTURE, BLOOD 2: NORMAL
EOSINOPHILS ABSOLUTE: 0 K/UL (ref 0–0.6)
EOSINOPHILS RELATIVE PERCENT: 0 %
GFR AFRICAN AMERICAN: >60
GFR NON-AFRICAN AMERICAN: >60
GLUCOSE BLD-MCNC: 120 MG/DL (ref 70–99)
HCT VFR BLD CALC: 41.1 % (ref 40.5–52.5)
HEMOGLOBIN: 13.3 G/DL (ref 13.5–17.5)
L. PNEUMOPHILA SEROGP 1 UR AG: NORMAL
LYMPHOCYTES ABSOLUTE: 0.5 K/UL (ref 1–5.1)
LYMPHOCYTES RELATIVE PERCENT: 4 %
MAGNESIUM: 2.4 MG/DL (ref 1.8–2.4)
MCH RBC QN AUTO: 31 PG (ref 26–34)
MCHC RBC AUTO-ENTMCNC: 32.3 G/DL (ref 31–36)
MCV RBC AUTO: 96 FL (ref 80–100)
MONOCYTES ABSOLUTE: 1 K/UL (ref 0–1.3)
MONOCYTES RELATIVE PERCENT: 7.7 %
NEUTROPHILS ABSOLUTE: 11.7 K/UL (ref 1.7–7.7)
NEUTROPHILS RELATIVE PERCENT: 87.9 %
PDW BLD-RTO: 15.2 % (ref 12.4–15.4)
PHOSPHORUS: 3.6 MG/DL (ref 2.5–4.9)
PLATELET # BLD: 210 K/UL (ref 135–450)
PMV BLD AUTO: 9.7 FL (ref 5–10.5)
POTASSIUM SERPL-SCNC: 4.9 MMOL/L (ref 3.5–5.1)
RBC # BLD: 4.28 M/UL (ref 4.2–5.9)
SODIUM BLD-SCNC: 141 MMOL/L (ref 136–145)
STREP PNEUMONIAE ANTIGEN, URINE: NORMAL
WBC # BLD: 13.4 K/UL (ref 4–11)

## 2018-10-16 PROCEDURE — 97110 THERAPEUTIC EXERCISES: CPT

## 2018-10-16 PROCEDURE — 6370000000 HC RX 637 (ALT 250 FOR IP): Performed by: INTERNAL MEDICINE

## 2018-10-16 PROCEDURE — 6360000002 HC RX W HCPCS: Performed by: INTERNAL MEDICINE

## 2018-10-16 PROCEDURE — 2700000000 HC OXYGEN THERAPY PER DAY

## 2018-10-16 PROCEDURE — C9113 INJ PANTOPRAZOLE SODIUM, VIA: HCPCS | Performed by: INTERNAL MEDICINE

## 2018-10-16 PROCEDURE — 84100 ASSAY OF PHOSPHORUS: CPT

## 2018-10-16 PROCEDURE — 94762 N-INVAS EAR/PLS OXIMTRY CONT: CPT

## 2018-10-16 PROCEDURE — 94640 AIRWAY INHALATION TREATMENT: CPT

## 2018-10-16 PROCEDURE — 36415 COLL VENOUS BLD VENIPUNCTURE: CPT

## 2018-10-16 PROCEDURE — 2000000000 HC ICU R&B

## 2018-10-16 PROCEDURE — 85025 COMPLETE CBC W/AUTO DIFF WBC: CPT

## 2018-10-16 PROCEDURE — 99233 SBSQ HOSP IP/OBS HIGH 50: CPT | Performed by: INTERNAL MEDICINE

## 2018-10-16 PROCEDURE — 97116 GAIT TRAINING THERAPY: CPT

## 2018-10-16 PROCEDURE — 6360000002 HC RX W HCPCS: Performed by: NURSE PRACTITIONER

## 2018-10-16 PROCEDURE — 83735 ASSAY OF MAGNESIUM: CPT

## 2018-10-16 PROCEDURE — 2580000003 HC RX 258: Performed by: INTERNAL MEDICINE

## 2018-10-16 PROCEDURE — 80048 BASIC METABOLIC PNL TOTAL CA: CPT

## 2018-10-16 PROCEDURE — 94660 CPAP INITIATION&MGMT: CPT

## 2018-10-16 RX ORDER — METHYLPREDNISOLONE SODIUM SUCCINATE 40 MG/ML
40 INJECTION, POWDER, LYOPHILIZED, FOR SOLUTION INTRAMUSCULAR; INTRAVENOUS EVERY 6 HOURS
Status: DISCONTINUED | OUTPATIENT
Start: 2018-10-16 | End: 2018-10-18

## 2018-10-16 RX ADMIN — METHYLPREDNISOLONE SODIUM SUCCINATE 40 MG: 40 INJECTION, POWDER, FOR SOLUTION INTRAMUSCULAR; INTRAVENOUS at 12:36

## 2018-10-16 RX ADMIN — Medication 10 ML: at 08:51

## 2018-10-16 RX ADMIN — Medication 10 ML: at 21:00

## 2018-10-16 RX ADMIN — PANTOPRAZOLE SODIUM 40 MG: 40 INJECTION, POWDER, FOR SOLUTION INTRAVENOUS at 20:58

## 2018-10-16 RX ADMIN — METHYLPREDNISOLONE SODIUM SUCCINATE 40 MG: 40 INJECTION, POWDER, FOR SOLUTION INTRAMUSCULAR; INTRAVENOUS at 20:52

## 2018-10-16 RX ADMIN — ALBUTEROL SULFATE 2.5 MG: 2.5 SOLUTION RESPIRATORY (INHALATION) at 08:12

## 2018-10-16 RX ADMIN — ALBUTEROL SULFATE 2.5 MG: 2.5 SOLUTION RESPIRATORY (INHALATION) at 16:41

## 2018-10-16 RX ADMIN — THEOPHYLLINE 400 MG: 400 TABLET, EXTENDED RELEASE ORAL at 08:50

## 2018-10-16 RX ADMIN — ALBUTEROL SULFATE 2.5 MG: 2.5 SOLUTION RESPIRATORY (INHALATION) at 12:08

## 2018-10-16 RX ADMIN — LOSARTAN POTASSIUM 100 MG: 50 TABLET ORAL at 08:50

## 2018-10-16 RX ADMIN — ENOXAPARIN SODIUM 40 MG: 40 INJECTION SUBCUTANEOUS at 08:51

## 2018-10-16 RX ADMIN — METOPROLOL TARTRATE 12.5 MG: 25 TABLET ORAL at 12:36

## 2018-10-16 RX ADMIN — TAMSULOSIN HYDROCHLORIDE 0.4 MG: 0.4 CAPSULE ORAL at 08:50

## 2018-10-16 RX ADMIN — BUDESONIDE 1000 MCG: 0.5 SUSPENSION RESPIRATORY (INHALATION) at 20:04

## 2018-10-16 RX ADMIN — FORMOTEROL FUMARATE DIHYDRATE 20 MCG: 20 SOLUTION RESPIRATORY (INHALATION) at 20:04

## 2018-10-16 RX ADMIN — METOPROLOL TARTRATE 12.5 MG: 25 TABLET ORAL at 20:57

## 2018-10-16 RX ADMIN — BUDESONIDE 1000 MCG: 0.5 SUSPENSION RESPIRATORY (INHALATION) at 08:12

## 2018-10-16 RX ADMIN — ALBUTEROL SULFATE 2.5 MG: 2.5 SOLUTION RESPIRATORY (INHALATION) at 20:04

## 2018-10-16 RX ADMIN — FORMOTEROL FUMARATE DIHYDRATE 20 MCG: 20 SOLUTION RESPIRATORY (INHALATION) at 08:12

## 2018-10-16 RX ADMIN — PANTOPRAZOLE SODIUM 40 MG: 40 INJECTION, POWDER, FOR SOLUTION INTRAVENOUS at 08:50

## 2018-10-16 RX ADMIN — TIOTROPIUM BROMIDE 18 MCG: 18 CAPSULE ORAL; RESPIRATORY (INHALATION) at 08:08

## 2018-10-16 RX ADMIN — Medication 10 ML: at 20:59

## 2018-10-16 RX ADMIN — METHYLPREDNISOLONE SODIUM SUCCINATE 40 MG: 40 INJECTION, POWDER, FOR SOLUTION INTRAMUSCULAR; INTRAVENOUS at 06:07

## 2018-10-16 RX ADMIN — ASPIRIN 81 MG: 81 TABLET, COATED ORAL at 08:51

## 2018-10-16 ASSESSMENT — PAIN SCALES - GENERAL
PAINLEVEL_OUTOF10: 0

## 2018-10-16 NOTE — PROGRESS NOTES
Nebulization BID       Continuous Infusions:      PRN Meds:  albuterol, perflutren lipid microspheres, cloNIDine, GI cocktail, sodium chloride flush, magnesium hydroxide, ondansetron    Labs reviewed:  CBC:   Recent Labs      10/14/18   0429  10/15/18   0522  10/16/18   0428   WBC  17.7*  14.0*  13.4*   HGB  13.4*  13.5  13.3*   HCT  40.4*  41.5  41.1   MCV  96.5  95.7  96.0   PLT  217  211  210     BMP:   Recent Labs      10/14/18   0429  10/15/18   0522  10/16/18   0427   NA  137  139  141   K  4.6  5.1  4.9   CL  97*  98*  102   CO2  30  32  28   PHOS  3.9  3.9  3.6   BUN  28*  28*  28*   CREATININE  0.8  0.9  0.9     LIVER PROFILE:   No results for input(s): AST, ALT, LIPASE, BILIDIR, BILITOT, ALKPHOS in the last 72 hours. Invalid input(s): AMYLASE,  ALB  PT/INR: No results for input(s): PROTIME, INR in the last 72 hours. APTT: No results for input(s): APTT in the last 72 hours. UA:No results for input(s): NITRITE, COLORU, PHUR, LABCAST, WBCUA, RBCUA, MUCUS, TRICHOMONAS, YEAST, BACTERIA, CLARITYU, SPECGRAV, LEUKOCYTESUR, UROBILINOGEN, BILIRUBINUR, BLOODU, GLUCOSEU, AMORPHOUS in the last 72 hours. Invalid input(s): [place]  No results for input(s): PH, PCO2, PO2 in the last 72 hours. Cx:      Films:  Radiology Review:  Pertinent images / reports were reviewed as a part of this visit. CT Chest w/ contrast: No results found for this or any previous visit. CT Chest w/o contrast: No results found for this or any previous visit. CTPA: No results found for this or any previous visit. CXR PA/LAT: No results found for this or any previous visit.     CXR portable:   Results for orders placed during the hospital encounter of 10/11/18   XR CHEST PORTABLE    Narrative EXAMINATION:  SINGLE XRAY VIEW OF THE CHEST    10/13/2018 4:31 am    COMPARISON:  10/11/2018    HISTORY:  ORDERING SYSTEM PROVIDED HISTORY: COPD Exacerbation  TECHNOLOGIST PROVIDED HISTORY:  Reason for exam:->COPD Exacerbation  Ordering

## 2018-10-16 NOTE — PROGRESS NOTES
Hospitalist Progress Note      PCP: [facility]    Date of Admission: 10/11/2018  Hospital day - 5        Medications:  Reviewed    Infusion Medications   Scheduled Medications    methylPREDNISolone  40 mg Intravenous Q6H    metoprolol tartrate  12.5 mg Oral BID    budesonide  1 mg Nebulization BID    albuterol  2.5 mg Nebulization Q4H WA    theophylline  400 mg Oral Daily    tamsulosin  0.4 mg Oral Daily    losartan  100 mg Oral Daily    aspirin  81 mg Oral Daily    tiotropium  18 mcg Inhalation Daily    pantoprazole  40 mg Intravenous BID    And    sodium chloride (PF)  10 mL Intravenous BID    sodium chloride flush  10 mL Intravenous 2 times per day    enoxaparin  40 mg Subcutaneous Daily    formoterol  20 mcg Nebulization BID     PRN Meds: albuterol, perflutren lipid microspheres, cloNIDine, GI cocktail, sodium chloride flush, magnesium hydroxide, ondansetron      Intake/Output Summary (Last 24 hours) at 10/16/18 1450  Last data filed at 10/15/18 1600   Gross per 24 hour   Intake              120 ml   Output                0 ml   Net              120 ml         Chief Complaint: sob ,m req bipap         Subjective: 10.16 - on Vapotherm after being on BiPAP throughout the night       ROS:  A 12 point review of symptoms is unremarkable with the exception of the chief complaint . Patient specifically denies cp  . Exam performed by me:    BP (!) 141/91   Pulse 104   Temp 97.6 °F (36.4 °C) (Oral)   Resp 16   Ht 5' 8\" (1.727 m)   Wt 200 lb 6.4 oz (90.9 kg)   SpO2 94%   BMI 30.47 kg/m²       General appearance: comfortable, distress- in mild resp   HEENT: Atraumatic, Pupils equal, round, and reactive to light. Extra ocular muscles intact. Neck: Supple, with full range of motion. No jugular venous distention.    Respiratory:  Clear to  auscultation , accessory muscle use no, Rales/Ronchi no  Cardiovascular: Regular rate and rhythm with normal S1/S2 ,  Abdomen: Soft, non-tender,

## 2018-10-16 NOTE — PROGRESS NOTES
walking more, return home. Plan    Plan  Times per week: 3-5 x wk in acute setting. Current Treatment Recommendations: Functional Mobility Training  Safety Devices  Type of devices: Call light within reach, Left in chair, Nurse notified (Pt aware to call for assist.  PT spoke with pt's nurse Kain Hendrix).  )     Therapy Time   Individual Concurrent Group Co-treatment   Time In 1100         Time Out 1125         Minutes 1610 [facility]

## 2018-10-17 LAB
ANION GAP SERPL CALCULATED.3IONS-SCNC: 10 MMOL/L (ref 3–16)
BASOPHILS ABSOLUTE: 0 K/UL (ref 0–0.2)
BASOPHILS RELATIVE PERCENT: 0.1 %
BUN BLDV-MCNC: 24 MG/DL (ref 7–20)
CALCIUM SERPL-MCNC: 8.6 MG/DL (ref 8.3–10.6)
CHLORIDE BLD-SCNC: 100 MMOL/L (ref 99–110)
CO2: 29 MMOL/L (ref 21–32)
CREAT SERPL-MCNC: 0.6 MG/DL (ref 0.8–1.3)
EOSINOPHILS ABSOLUTE: 0 K/UL (ref 0–0.6)
EOSINOPHILS RELATIVE PERCENT: 0.1 %
GFR AFRICAN AMERICAN: >60
GFR NON-AFRICAN AMERICAN: >60
GLUCOSE BLD-MCNC: 123 MG/DL (ref 70–99)
HCT VFR BLD CALC: 40.4 % (ref 40.5–52.5)
HEMOGLOBIN: 13.1 G/DL (ref 13.5–17.5)
LYMPHOCYTES ABSOLUTE: 0.5 K/UL (ref 1–5.1)
LYMPHOCYTES RELATIVE PERCENT: 3.7 %
MAGNESIUM: 2.3 MG/DL (ref 1.8–2.4)
MCH RBC QN AUTO: 30.9 PG (ref 26–34)
MCHC RBC AUTO-ENTMCNC: 32.5 G/DL (ref 31–36)
MCV RBC AUTO: 95.3 FL (ref 80–100)
MONOCYTES ABSOLUTE: 0.4 K/UL (ref 0–1.3)
MONOCYTES RELATIVE PERCENT: 3.1 %
NEUTROPHILS ABSOLUTE: 12.4 K/UL (ref 1.7–7.7)
NEUTROPHILS RELATIVE PERCENT: 93 %
PDW BLD-RTO: 15.3 % (ref 12.4–15.4)
PHOSPHORUS: 3.5 MG/DL (ref 2.5–4.9)
PLATELET # BLD: 217 K/UL (ref 135–450)
PMV BLD AUTO: 10.4 FL (ref 5–10.5)
POTASSIUM SERPL-SCNC: 4.8 MMOL/L (ref 3.5–5.1)
RBC # BLD: 4.23 M/UL (ref 4.2–5.9)
SODIUM BLD-SCNC: 139 MMOL/L (ref 136–145)
WBC # BLD: 13.4 K/UL (ref 4–11)

## 2018-10-17 PROCEDURE — 36415 COLL VENOUS BLD VENIPUNCTURE: CPT

## 2018-10-17 PROCEDURE — 83735 ASSAY OF MAGNESIUM: CPT

## 2018-10-17 PROCEDURE — 99232 SBSQ HOSP IP/OBS MODERATE 35: CPT | Performed by: INTERNAL MEDICINE

## 2018-10-17 PROCEDURE — 80048 BASIC METABOLIC PNL TOTAL CA: CPT

## 2018-10-17 PROCEDURE — 6360000002 HC RX W HCPCS: Performed by: INTERNAL MEDICINE

## 2018-10-17 PROCEDURE — 97530 THERAPEUTIC ACTIVITIES: CPT

## 2018-10-17 PROCEDURE — 6360000002 HC RX W HCPCS: Performed by: NURSE PRACTITIONER

## 2018-10-17 PROCEDURE — 97535 SELF CARE MNGMENT TRAINING: CPT

## 2018-10-17 PROCEDURE — 85025 COMPLETE CBC W/AUTO DIFF WBC: CPT

## 2018-10-17 PROCEDURE — 2060000000 HC ICU INTERMEDIATE R&B

## 2018-10-17 PROCEDURE — 6370000000 HC RX 637 (ALT 250 FOR IP): Performed by: INTERNAL MEDICINE

## 2018-10-17 PROCEDURE — 2580000003 HC RX 258: Performed by: INTERNAL MEDICINE

## 2018-10-17 PROCEDURE — 94762 N-INVAS EAR/PLS OXIMTRY CONT: CPT

## 2018-10-17 PROCEDURE — 84100 ASSAY OF PHOSPHORUS: CPT

## 2018-10-17 PROCEDURE — C9113 INJ PANTOPRAZOLE SODIUM, VIA: HCPCS | Performed by: INTERNAL MEDICINE

## 2018-10-17 PROCEDURE — 94640 AIRWAY INHALATION TREATMENT: CPT

## 2018-10-17 PROCEDURE — 97110 THERAPEUTIC EXERCISES: CPT

## 2018-10-17 PROCEDURE — 94660 CPAP INITIATION&MGMT: CPT

## 2018-10-17 PROCEDURE — 2700000000 HC OXYGEN THERAPY PER DAY

## 2018-10-17 RX ADMIN — FORMOTEROL FUMARATE DIHYDRATE 20 MCG: 20 SOLUTION RESPIRATORY (INHALATION) at 19:25

## 2018-10-17 RX ADMIN — THEOPHYLLINE 400 MG: 400 TABLET, EXTENDED RELEASE ORAL at 07:45

## 2018-10-17 RX ADMIN — METHYLPREDNISOLONE SODIUM SUCCINATE 40 MG: 40 INJECTION, POWDER, FOR SOLUTION INTRAMUSCULAR; INTRAVENOUS at 11:56

## 2018-10-17 RX ADMIN — ALBUTEROL SULFATE 2.5 MG: 2.5 SOLUTION RESPIRATORY (INHALATION) at 19:25

## 2018-10-17 RX ADMIN — LOSARTAN POTASSIUM 100 MG: 50 TABLET ORAL at 07:44

## 2018-10-17 RX ADMIN — METHYLPREDNISOLONE SODIUM SUCCINATE 40 MG: 40 INJECTION, POWDER, FOR SOLUTION INTRAMUSCULAR; INTRAVENOUS at 18:09

## 2018-10-17 RX ADMIN — METOPROLOL TARTRATE 12.5 MG: 25 TABLET ORAL at 20:12

## 2018-10-17 RX ADMIN — Medication 10 ML: at 07:45

## 2018-10-17 RX ADMIN — Medication 10 ML: at 20:11

## 2018-10-17 RX ADMIN — ALBUTEROL SULFATE 2.5 MG: 2.5 SOLUTION RESPIRATORY (INHALATION) at 11:36

## 2018-10-17 RX ADMIN — ENOXAPARIN SODIUM 40 MG: 40 INJECTION SUBCUTANEOUS at 07:44

## 2018-10-17 RX ADMIN — METOPROLOL TARTRATE 12.5 MG: 25 TABLET ORAL at 07:44

## 2018-10-17 RX ADMIN — Medication 10 ML: at 20:16

## 2018-10-17 RX ADMIN — PANTOPRAZOLE SODIUM 40 MG: 40 INJECTION, POWDER, FOR SOLUTION INTRAVENOUS at 07:44

## 2018-10-17 RX ADMIN — BUDESONIDE 1000 MCG: 0.5 SUSPENSION RESPIRATORY (INHALATION) at 19:25

## 2018-10-17 RX ADMIN — METHYLPREDNISOLONE SODIUM SUCCINATE 40 MG: 40 INJECTION, POWDER, FOR SOLUTION INTRAMUSCULAR; INTRAVENOUS at 01:00

## 2018-10-17 RX ADMIN — ALBUTEROL SULFATE 2.5 MG: 2.5 SOLUTION RESPIRATORY (INHALATION) at 08:24

## 2018-10-17 RX ADMIN — TIOTROPIUM BROMIDE 18 MCG: 18 CAPSULE ORAL; RESPIRATORY (INHALATION) at 08:25

## 2018-10-17 RX ADMIN — PANTOPRAZOLE SODIUM 40 MG: 40 INJECTION, POWDER, FOR SOLUTION INTRAVENOUS at 20:11

## 2018-10-17 RX ADMIN — METHYLPREDNISOLONE SODIUM SUCCINATE 40 MG: 40 INJECTION, POWDER, FOR SOLUTION INTRAMUSCULAR; INTRAVENOUS at 06:15

## 2018-10-17 RX ADMIN — BUDESONIDE 1000 MCG: 0.5 SUSPENSION RESPIRATORY (INHALATION) at 08:25

## 2018-10-17 RX ADMIN — FORMOTEROL FUMARATE DIHYDRATE 20 MCG: 20 SOLUTION RESPIRATORY (INHALATION) at 08:25

## 2018-10-17 RX ADMIN — ASPIRIN 81 MG: 81 TABLET, COATED ORAL at 07:44

## 2018-10-17 RX ADMIN — TAMSULOSIN HYDROCHLORIDE 0.4 MG: 0.4 CAPSULE ORAL at 07:45

## 2018-10-17 RX ADMIN — ALBUTEROL SULFATE 2.5 MG: 2.5 SOLUTION RESPIRATORY (INHALATION) at 16:14

## 2018-10-17 NOTE — PROGRESS NOTES
0900:  in to see patient updated, keep ICU one more day. 0930: [facility], gave social work number to update, MD made aware, if transfer happening let him know. 1000:  in to see patient, updated, ok for patient to be high 80's on high flow canula. Order for PCU  1200: VSS  1600: VSS  1900:  Handoff with Tran Rojas RN.

## 2018-10-17 NOTE — CARE COORDINATION
Chart reviewed. Pt continues to have uncontrolled respiratory symptoms. Solumedrol increased, still needing bipap    Barrier to DC: respiratory interventions not available at lesser level of care  DC Plan: Home w/hhc through [address] Date: MD to advise. MARIELA required. -PT/OT/RN      7244;  Rec'd call this am from [facility] stating they have ICU beds open and asking if pt wants to be transferred. Met w/pt in room and he does NOT want to be transferred to the [facility]. Pt states he has been assigned a pcp but his wife, who is at work, knows the details. I called the [facility] ([phone]) to advise pt is not interested in changing facilities. The PCP is Ashly Savage [phone]. Spoke to admitting who added this to pt's record. Once dc date is known please call the [place] triage line for home care at [phone]. They appreciate at least 24hr notice.     Per rounds this am with Dr Zoe Liao, hopefully will transfer out of icu tomorrow to PCU    Paco Suarez, ELENI  Case management  [phone]

## 2018-10-17 NOTE — PROGRESS NOTES
Functional Limits     Balance  Sitting Balance: Independent  Standing Balance: Supervision  Standing Balance  Activity: stood at table after completing grooming for 1.5 Minutes. Pts mena remained in mid 90's while standing - he fatigued and needed to sit after starting to talk more. Needed cues to stop and rest to allow sats to recover. Sats seemed to drop lowest when talking and when cleaning his dentures- not in stance. ADL  Grooming: Setup;Stand by assistance (seated at table for grooming, set up  to clean his own dentures. needed several rests during activty and cues to stop and use PLB to bring sats back up , washed face , cleaned mouth and replaced his dentures.)  Additional Comments: Washed face w/ basin and cloth        Cognition  Overall Cognitive Status: [facility]    Assessment   Performance deficits / Impairments: Decreased functional mobility ; Decreased ADL status; Decreased endurance;Decreased high-level IADLs;Decreased strength;Decreased ROM; Decreased balance  Assessment:  Pt tolerated session fairly well. Pt easily SOB, -02 sats ranged from 76%  to  96 % overall, on initially 15 L high flow. today sats dropped lowest when talking, but returned to high 80s then rapidly to 96%. Sats actually higher after standing. Pt stated nursing helped him earlier w/ a total bath. he managed grooming and denture cleaning seated after set up. Pt needs frequent rests and cues to stop talking and breath at times. Pt CGA for- SBA for stance. Pt mena returning to 88 % or above more quickly -dropped into high 70s very briefly on less O2 today. did not require the flow of O2 to be increased today . Pt in high 90's at close of tx. Pt functioning below his baseline and will benefit from continued OT. Anticipate pt will be able to returnhome with wife after skilled OT in hospital. Cont with POC. Treatment Diagnosis: decreased ADL, mobility, IADL  Prognosis: Good  History:  \"The patient is a 80 y.o. male who [facility] to [facility])

## 2018-10-17 NOTE — PROGRESS NOTES
PLT  211  210  217     BMP:   Recent Labs      10/15/18   0522  10/16/18   0427  10/17/18   0448   NA  139  141  139   K  5.1  4.9  4.8   CL  98*  102  100   CO2  32  28  29   PHOS  3.9  3.6  3.5   BUN  28*  28*  24*   CREATININE  0.9  0.9  0.6*     LIVER PROFILE:   No results for input(s): AST, ALT, LIPASE, BILIDIR, BILITOT, ALKPHOS in the last 72 hours. Invalid input(s): AMYLASE,  ALB  PT/INR: No results for input(s): PROTIME, INR in the last 72 hours. APTT: No results for input(s): APTT in the last 72 hours. UA:No results for input(s): NITRITE, COLORU, PHUR, LABCAST, WBCUA, RBCUA, MUCUS, TRICHOMONAS, YEAST, BACTERIA, CLARITYU, SPECGRAV, LEUKOCYTESUR, UROBILINOGEN, BILIRUBINUR, BLOODU, GLUCOSEU, AMORPHOUS in the last 72 hours. Invalid input(s): Brandon Velazquez  No results for input(s): PH, PCO2, PO2 in the last 72 hours. Cx:      Films:  Radiology Review:  Pertinent images / reports were reviewed as a part of this visit. CT Chest w/ contrast: No results found for this or any previous visit. CT Chest w/o contrast: No results found for this or any previous visit. CTPA: No results found for this or any previous visit. CXR PA/LAT: No results found for this or any previous visit. CXR portable:   Results for orders placed during the hospital encounter of 10/11/18   XR CHEST PORTABLE    Narrative EXAMINATION:  SINGLE XRAY VIEW OF THE CHEST    10/13/2018 4:31 am    COMPARISON:  10/11/2018    HISTORY:  ORDERING SYSTEM PROVIDED HISTORY: COPD Exacerbation  TECHNOLOGIST PROVIDED HISTORY:  Reason for exam:->COPD Exacerbation  Ordering Physician Provided Reason for Exam: COPD Exacerbation  Acuity: Acute  Type of Exam: Subsequent/Follow-up    FINDINGS:  The lungs are clear. The costophrenic angles are sharp. The  cardiomediastinal silhouette is within normal limits. Sternal wires are  again noted. There is no discernible pneumothorax. Degenerative changes are  again seen in both shoulders.

## 2018-10-18 LAB
ANION GAP SERPL CALCULATED.3IONS-SCNC: 9 MMOL/L (ref 3–16)
BASOPHILS ABSOLUTE: 0.1 K/UL (ref 0–0.2)
BASOPHILS RELATIVE PERCENT: 0.4 %
BLOOD CULTURE, ROUTINE: NORMAL
BUN BLDV-MCNC: 26 MG/DL (ref 7–20)
CALCIUM SERPL-MCNC: 8.6 MG/DL (ref 8.3–10.6)
CHLORIDE BLD-SCNC: 102 MMOL/L (ref 99–110)
CO2: 29 MMOL/L (ref 21–32)
CREAT SERPL-MCNC: 0.7 MG/DL (ref 0.8–1.3)
CULTURE, BLOOD 2: NORMAL
EOSINOPHILS ABSOLUTE: 0 K/UL (ref 0–0.6)
EOSINOPHILS RELATIVE PERCENT: 0.1 %
GFR AFRICAN AMERICAN: >60
GFR NON-AFRICAN AMERICAN: >60
GLUCOSE BLD-MCNC: 126 MG/DL (ref 70–99)
HCT VFR BLD CALC: 40.2 % (ref 40.5–52.5)
HEMOGLOBIN: 13.1 G/DL (ref 13.5–17.5)
LYMPHOCYTES ABSOLUTE: 0.5 K/UL (ref 1–5.1)
LYMPHOCYTES RELATIVE PERCENT: 3.2 %
MAGNESIUM: 2.4 MG/DL (ref 1.8–2.4)
MCH RBC QN AUTO: 30.9 PG (ref 26–34)
MCHC RBC AUTO-ENTMCNC: 32.5 G/DL (ref 31–36)
MCV RBC AUTO: 95 FL (ref 80–100)
MONOCYTES ABSOLUTE: 0.7 K/UL (ref 0–1.3)
MONOCYTES RELATIVE PERCENT: 4.4 %
NEUTROPHILS ABSOLUTE: 15.1 K/UL (ref 1.7–7.7)
NEUTROPHILS RELATIVE PERCENT: 91.9 %
PDW BLD-RTO: 15.1 % (ref 12.4–15.4)
PHOSPHORUS: 3.6 MG/DL (ref 2.5–4.9)
PLATELET # BLD: 200 K/UL (ref 135–450)
PMV BLD AUTO: 10.6 FL (ref 5–10.5)
POTASSIUM SERPL-SCNC: 4.8 MMOL/L (ref 3.5–5.1)
RBC # BLD: 4.24 M/UL (ref 4.2–5.9)
SODIUM BLD-SCNC: 140 MMOL/L (ref 136–145)
WBC # BLD: 16.5 K/UL (ref 4–11)

## 2018-10-18 PROCEDURE — 80048 BASIC METABOLIC PNL TOTAL CA: CPT

## 2018-10-18 PROCEDURE — 6370000000 HC RX 637 (ALT 250 FOR IP): Performed by: INTERNAL MEDICINE

## 2018-10-18 PROCEDURE — 97530 THERAPEUTIC ACTIVITIES: CPT

## 2018-10-18 PROCEDURE — 2060000000 HC ICU INTERMEDIATE R&B

## 2018-10-18 PROCEDURE — 94762 N-INVAS EAR/PLS OXIMTRY CONT: CPT

## 2018-10-18 PROCEDURE — 99232 SBSQ HOSP IP/OBS MODERATE 35: CPT | Performed by: INTERNAL MEDICINE

## 2018-10-18 PROCEDURE — 84100 ASSAY OF PHOSPHORUS: CPT

## 2018-10-18 PROCEDURE — 36415 COLL VENOUS BLD VENIPUNCTURE: CPT

## 2018-10-18 PROCEDURE — 94640 AIRWAY INHALATION TREATMENT: CPT

## 2018-10-18 PROCEDURE — 6360000002 HC RX W HCPCS: Performed by: INTERNAL MEDICINE

## 2018-10-18 PROCEDURE — 83735 ASSAY OF MAGNESIUM: CPT

## 2018-10-18 PROCEDURE — 85025 COMPLETE CBC W/AUTO DIFF WBC: CPT

## 2018-10-18 PROCEDURE — 2700000000 HC OXYGEN THERAPY PER DAY

## 2018-10-18 PROCEDURE — 94664 DEMO&/EVAL PT USE INHALER: CPT

## 2018-10-18 PROCEDURE — C9113 INJ PANTOPRAZOLE SODIUM, VIA: HCPCS | Performed by: INTERNAL MEDICINE

## 2018-10-18 PROCEDURE — 99406 BEHAV CHNG SMOKING 3-10 MIN: CPT

## 2018-10-18 PROCEDURE — 2580000003 HC RX 258: Performed by: INTERNAL MEDICINE

## 2018-10-18 PROCEDURE — 6360000002 HC RX W HCPCS: Performed by: NURSE PRACTITIONER

## 2018-10-18 RX ORDER — METHYLPREDNISOLONE SODIUM SUCCINATE 40 MG/ML
40 INJECTION, POWDER, LYOPHILIZED, FOR SOLUTION INTRAMUSCULAR; INTRAVENOUS EVERY 8 HOURS
Status: DISCONTINUED | OUTPATIENT
Start: 2018-10-18 | End: 2018-10-19

## 2018-10-18 RX ADMIN — Medication 10 ML: at 09:45

## 2018-10-18 RX ADMIN — ASPIRIN 81 MG: 81 TABLET, COATED ORAL at 09:45

## 2018-10-18 RX ADMIN — Medication 10 ML: at 20:44

## 2018-10-18 RX ADMIN — ALBUTEROL SULFATE 2.5 MG: 2.5 SOLUTION RESPIRATORY (INHALATION) at 16:06

## 2018-10-18 RX ADMIN — METOPROLOL TARTRATE 12.5 MG: 25 TABLET ORAL at 09:46

## 2018-10-18 RX ADMIN — METOPROLOL TARTRATE 25 MG: 25 TABLET ORAL at 20:43

## 2018-10-18 RX ADMIN — TIOTROPIUM BROMIDE 18 MCG: 18 CAPSULE ORAL; RESPIRATORY (INHALATION) at 08:21

## 2018-10-18 RX ADMIN — METHYLPREDNISOLONE SODIUM SUCCINATE 40 MG: 40 INJECTION, POWDER, FOR SOLUTION INTRAMUSCULAR; INTRAVENOUS at 21:20

## 2018-10-18 RX ADMIN — BUDESONIDE 1000 MCG: 0.5 SUSPENSION RESPIRATORY (INHALATION) at 08:21

## 2018-10-18 RX ADMIN — ALBUTEROL SULFATE 2.5 MG: 2.5 SOLUTION RESPIRATORY (INHALATION) at 19:56

## 2018-10-18 RX ADMIN — METHYLPREDNISOLONE SODIUM SUCCINATE 40 MG: 40 INJECTION, POWDER, FOR SOLUTION INTRAMUSCULAR; INTRAVENOUS at 05:47

## 2018-10-18 RX ADMIN — FORMOTEROL FUMARATE DIHYDRATE 20 MCG: 20 SOLUTION RESPIRATORY (INHALATION) at 08:21

## 2018-10-18 RX ADMIN — THEOPHYLLINE 400 MG: 400 TABLET, EXTENDED RELEASE ORAL at 09:45

## 2018-10-18 RX ADMIN — TAMSULOSIN HYDROCHLORIDE 0.4 MG: 0.4 CAPSULE ORAL at 09:45

## 2018-10-18 RX ADMIN — LOSARTAN POTASSIUM 100 MG: 50 TABLET ORAL at 09:45

## 2018-10-18 RX ADMIN — ENOXAPARIN SODIUM 40 MG: 40 INJECTION SUBCUTANEOUS at 09:45

## 2018-10-18 RX ADMIN — ALBUTEROL SULFATE 2.5 MG: 2.5 SOLUTION RESPIRATORY (INHALATION) at 08:21

## 2018-10-18 RX ADMIN — ALBUTEROL SULFATE 2.5 MG: 2.5 SOLUTION RESPIRATORY (INHALATION) at 12:04

## 2018-10-18 RX ADMIN — PANTOPRAZOLE SODIUM 40 MG: 40 INJECTION, POWDER, FOR SOLUTION INTRAVENOUS at 09:45

## 2018-10-18 RX ADMIN — METHYLPREDNISOLONE SODIUM SUCCINATE 40 MG: 40 INJECTION, POWDER, FOR SOLUTION INTRAMUSCULAR; INTRAVENOUS at 13:09

## 2018-10-18 RX ADMIN — Medication 10 ML: at 09:46

## 2018-10-18 RX ADMIN — BUDESONIDE 1000 MCG: 0.5 SUSPENSION RESPIRATORY (INHALATION) at 19:56

## 2018-10-18 RX ADMIN — PANTOPRAZOLE SODIUM 40 MG: 40 INJECTION, POWDER, FOR SOLUTION INTRAVENOUS at 20:43

## 2018-10-18 RX ADMIN — METHYLPREDNISOLONE SODIUM SUCCINATE 40 MG: 40 INJECTION, POWDER, FOR SOLUTION INTRAMUSCULAR; INTRAVENOUS at 00:09

## 2018-10-18 RX ADMIN — FORMOTEROL FUMARATE DIHYDRATE 20 MCG: 20 SOLUTION RESPIRATORY (INHALATION) at 16:17

## 2018-10-18 ASSESSMENT — PAIN SCALES - GENERAL
PAINLEVEL_OUTOF10: 0

## 2018-10-18 NOTE — CONSULTS
10/18/2018    GFRAA >60 10/18/2018    LABGLOM >60 10/18/2018    GLUCOSE 126 10/18/2018     CBC:   Recent Labs      10/16/18   0428  10/17/18   0448  10/18/18   0423   WBC  13.4*  13.4*  16.5*   HGB  13.3*  13.1*  13.1*   HCT  41.1  40.4*  40.2*   MCV  96.0  95.3  95.0   PLT  210  217  200     BNP:   Lab Results   Component Value Date    PROBNP 326 10/11/2018     ABG:  Lab Results   Component Value Date    PHART 7.342 10/14/2018    ZKA6WND 57.2 10/14/2018    PO2ART 72.1 10/14/2018    WTE2ASE 30.2 10/14/2018    BEART 3.3 10/14/2018    E2PDJAUJ 93.7 10/14/2018    CARBOXHGBART 0.7 10/14/2018    METHGBART 0.7 10/14/2018    TLM0KZD 31.9 10/14/2018    B4AUUOPLZ 19 10/14/2018     VBG:No results found for: Shirlyn Hence, PO2VEN, CFG2RQX, BEVEN, N3FCIFXQ, CARBOXYHGB, METHGBVEN, BHP2HADDDSS, S5OPBFBTZ      Pulmonary History: COPD   Home Respiratory Therapy Medications Albuterol, Budesonide/Formoterol  and Tiotropium Bromide  Home Oxygen Therapy: 3L    Current Respiratory Therapy:  Pulmicort, perforomist, duonebs, spiriva  Treatment Type: HHN  Medications: Albuterol        Pulmonary Function Test Results: none    Assessment:     CONSULTS:   IP CONSULT TO HOSPITALIST  IP CONSULT TO PULMONOLOGY  IP CONSULT TO INFECTIOUS DISEASES  IP CONSULT TO COPD NURSE      Patient has a PULMONOLOGY CONSULT: Yes     Patient receives flu vaccine:  Yes   Patient receives pneumonia vaccine:   Yes   Patient was given smoking cessation counseling: Yes    Patient would benefit from Pulmonary Rehabilitation:  Yes- pt recently quit rehab at a different facility. States he will reconsider starting the program again    Educated on correct MDI technique:  Yes       EDUCATION STATUS: Patient   [x]  Provided both written and verbal education on COPD signs/symptoms. [x]  Provided instructions on when to take medications. [x]  Provided instructions on modalities . []  Provided instructions to how to accurately read pulse oximeter.   []  Provided infection control, energy conservation, and when to call the doctor ([place]). Pt is currently wearing 9L HF, called home care company to verify liter flow. Pt also needs set up on NIV. Dr Frances Moffett writing notes to support decision. Spoke with [facility] ([address]) they are unable to provide NIV. [facility] who [facility] that [facility]. Have spoken with many departments at [facility] without success on how to get pt NIV. Waiting on several calls back.           Patient rates program/content      Electronically signed by Jade Driscoll RCP, COPD Coordinator,  on 10/18/2018 at 2:04 PM

## 2018-10-18 NOTE — PROGRESS NOTES
Independent  Additional Comments: Pt stated he is indep with IADL's, was recently working on the roof - (takes small oxygen tank with him)       Objective   Vision: Within Functional Limits  Hearing: Within functional limits    Orientation  Overall Orientation Status: Within Normal Limits     Balance  Sitting Balance: Independent  Standing Balance: Stand by assistance (SBA-S w/ RW and assist for lines- )  Standing Balance  Activity: attempted amb w/o AD pt unstaedy needing to stabalize self w/ bed rail. Pt given RW he proceeded  to ambulate in his room continuously for 350 feet minimum ( occasional standing rest breaks to manage lines)   Sit to stand: Stand by assistance  Stand to sit: Stand by assistance  Functional Mobility  Assist Level:  (SBA-CGA x 1 assist w/ lines)  Functional Mobility Comments: limited in distance by O2 line and heart monitor and pulse ox . Pt able to make laps thru room with full turns 9 x  over 350 feet before fatiguing . O2 sats  still above 90% the entire time and cj to 95% at rest - average was 92%              Transfers  Stand Step Transfers: Contact guard assistance  Sit to stand: Stand by assistance  Stand to sit: Stand by assistance     Cognition  Overall Cognitive Status: WNL       Assessment   Assessment: Pt O2 decreased to 5 L on NC by resp therapy prior to walking in room at pt's request. Pt dressed in his own clothing and stated he felt good today. Pt able to ambulate in room with a RW for stability for 350 feet + ,. O2 sats averaged 92% most of time and never went below 90% on 5 L while ambulating. Pt needed only CGA to SBA for ambulation and assist w/ lines. Pt slightly below his baseline and will benefit from continued OT in hosp. Jelly Cifeuntes Pt will be able to return home with wife after skilled OT in hospital. Cont with POC. Treatment Diagnosis: decreased ADL, mobility, IADL  Prognosis: Good  History: \"The patient is a 80 y.o. male who presents to [facility] with SOB.  Pt on BIPAP and lethargic during my exam, hence history obtained from spouse/ER records. Pt gets his care at the [place], apparently had some \"viral URI\" and was admitted at [place] from Sunday and was discharged from the hospital yesterday to home on PO steroids. Spouse states that pt cont to feel SOB yesterday after he got home, uses 3L O2 at baseline and needed to be increased to 4L with ambulation but even then pt pt found it hard to breathe. This am, he was very weak and SOB and hence squad was called and pt was brought to the ER. ABG revealed hypercapnia and hence pt placed on BIPAP and admitted. Was very anxious on the BIPAP and received ativan and pt very lethargic at present\"(per note Dr Jerrod Prince 10/11/18)  Patient Education: Dallin Pak in breath control and pacing his activity   REQUIRES OT FOLLOW UP: Yes  Activity Tolerance  Activity Tolerance: Patient Tolerated treatment well  Activity Tolerance: on 5 L O2 - ambulated 350 feet w/ RW  Safety Devices  Safety Devices in place: Yes  Type of devices: Left in chair;Nurse notified;Call light within reach (pt aware he is not to get up w/o assist)         Plan   Plan  Times per week: 3-5 times per week  Times per day: Daily  Plan weeks: until discharges  Current Treatment Recommendations: Strengthening, Endurance Training, Patient/Caregiver Education & Training, Self-Care / ADL, Balance Training, Functional Mobility Training, Safety Education & Training, Equipment Evaluation, Education, & procurement  Plan Comment: see last progress note for discharge status    G-Code  OT G-codes  Score: 19 CK  OutComes Score    AM-PAC Score    Anneliese Esposito scored a 19/24 on the AM-PAC ADL Inpatient form. Current research shows that an AM-PAC score of 18 or greater is typically associated with a discharge to the patient's home setting.  Based on the patients AM-PAC score and their current ADL deficits, it is recommended that the patient have 2-3 sessions per week of Occupational Therapy at d/c to increase the patients independence. AM-PAC Inpatient Daily Activity Raw Score: 19  AM-PAC Inpatient ADL T-Scale Score : 40.22  ADL Inpatient CMS 0-100% Score: 42.8  ADL Inpatient CMS G-Code Modifier : CK    Goals  Short term goals  Time Frame for Short term goals: status: goals  progressing very well  Short term goal 1: self care indep with oxygen . ..(status 10/17 today on 15 L per NC high flow sats varied from 76% to 96%, most often in high 80S to low 90s)  Short term goal 2: transfers indep with oxygen, no AD --- 10/18 needed RW and only 5 L O2  Short term goal 3: funcitonal mobility indep with oxygen , no AD   ---10/18 needed RW and only 5 L O2  Short term goal 4: increased activity tolerance, use of pursed lip breathing to maintain adequate oxygen sats. ... status 10/17 cues to stop for rests and PLB- needs cues tyo rest particularly when talking  Long term goals  Time Frame for Long term goals : same as stg  Patient Goals   Patient goals : \"I just want to get stronger to get out of here \"  Agreed he wanted to be able to get up and walk do his own personal care\"       Therapy Time   Individual Concurrent Group Co-treatment   Time In 1600         Time Out 1655         Minutes 54               If patient is discharged prior to next OT visit , refer to last OT progress note for Discharge Status.   Scott Ellis, OT  LIC # [id number]

## 2018-10-18 NOTE — PROGRESS NOTES
MCV  96.0  95.3  95.0   PLT  210  217  200     BMP:   Recent Labs      10/16/18   0427  10/17/18   0448  10/18/18   0423   NA  141  139  140   K  4.9  4.8  4.8   CL  102  100  102   CO2  28  29  29   PHOS  3.6  3.5  3.6   BUN  28*  24*  26*   CREATININE  0.9  0.6*  0.7*     LIVER PROFILE:   No results for input(s): AST, ALT, LIPASE, BILIDIR, BILITOT, ALKPHOS in the last 72 hours. Invalid input(s): AMYLASE,  ALB  PT/INR: No results for input(s): PROTIME, INR in the last 72 hours. APTT: No results for input(s): APTT in the last 72 hours. UA:No results for input(s): NITRITE, COLORU, PHUR, LABCAST, WBCUA, RBCUA, MUCUS, TRICHOMONAS, YEAST, BACTERIA, CLARITYU, SPECGRAV, LEUKOCYTESUR, UROBILINOGEN, BILIRUBINUR, BLOODU, GLUCOSEU, AMORPHOUS in the last 72 hours. Invalid input(s): Laquetta Mile  No results for input(s): PH, PCO2, PO2 in the last 72 hours. Cx:      Films:  Radiology Review:  Pertinent images / reports were reviewed as a part of this visit. CT Chest w/ contrast: No results found for this or any previous visit. CT Chest w/o contrast: No results found for this or any previous visit. CTPA: No results found for this or any previous visit. CXR PA/LAT: No results found for this or any previous visit. CXR portable:   Results for orders placed during the hospital encounter of 10/11/18   XR CHEST PORTABLE    Narrative EXAMINATION:  SINGLE XRAY VIEW OF THE CHEST    10/13/2018 4:31 am    COMPARISON:  10/11/2018    HISTORY:  ORDERING SYSTEM PROVIDED HISTORY: COPD Exacerbation  TECHNOLOGIST PROVIDED HISTORY:  Reason for exam:->COPD Exacerbation  Ordering Physician Provided Reason for Exam: COPD Exacerbation  Acuity: Acute  Type of Exam: Subsequent/Follow-up    FINDINGS:  The lungs are clear. The costophrenic angles are sharp. The  cardiomediastinal silhouette is within normal limits. Sternal wires are  again noted. There is no discernible pneumothorax.   Degenerative changes are  again seen in both shoulders. Impression No acute disease. Assessment:       COPD  Acute hypoxemic respiratory failure  2 hypercapnic respiratory failure  Blood culture positive for strep viridans 102  History of aortic valve replacement  History of splenectomy  History of blood clots in the arm, unclear if DVT or SVT      Plan:      Bacteremia  - Antibiotics per infectious disease    COPD  - Solu-Medrol 40 every 6. Decrease to q 8.   -  budesonide to 1 g twice a day , continue Perforomist  - Spiriva  - Theophylline,   - Home BiPAP insufficient due to severity of disease. Severe copd is the primary cause of chronic respiratory failure. Nocturnal and day time volume ventilator is required to decrease copd readmission and decreased symptoms such as shortness of breath. Acute hypoxemia  - appears to be getting better. Hypertension  - Clonidine  -  metoprolol 12.5 mg bid. Return to Home dose 25 mg twice a day. This note was transcribed using [id number] [employer]. Please disregard any translational errors.       Daren Tina Pulmonary, Sleep and [facility] [phone]

## 2018-10-18 NOTE — PLAN OF CARE
Problem: Risk for Impaired Skin Integrity  Goal: Tissue integrity - skin and mucous membranes  Structural intactness and normal physiological function of skin and  mucous membranes. Outcome: Ongoing  Monitoring patient skin integrity for skin breakdown, turning and repositioning q2h per protocol. Patient demonstrates turning and repositioning self. Problem: Falls - Risk of:  Goal: Absence of physical injury  Absence of physical injury   Outcome: Ongoing  Fall risk precautions in place. Call light within reach. Pt uses call light appropriately to call for assistance with ambulating.

## 2018-10-19 LAB
ACANTHOCYTES: ABNORMAL
ANION GAP SERPL CALCULATED.3IONS-SCNC: 10 MMOL/L (ref 3–16)
ANISOCYTOSIS: ABNORMAL
BANDED NEUTROPHILS RELATIVE PERCENT: 3 % (ref 0–7)
BASOPHILS ABSOLUTE: 0 K/UL (ref 0–0.2)
BASOPHILS RELATIVE PERCENT: 0 %
BUN BLDV-MCNC: 24 MG/DL (ref 7–20)
BURR CELLS: ABNORMAL
CALCIUM SERPL-MCNC: 8.5 MG/DL (ref 8.3–10.6)
CHLORIDE BLD-SCNC: 102 MMOL/L (ref 99–110)
CO2: 29 MMOL/L (ref 21–32)
CREAT SERPL-MCNC: 0.7 MG/DL (ref 0.8–1.3)
EOSINOPHILS ABSOLUTE: 0 K/UL (ref 0–0.6)
EOSINOPHILS RELATIVE PERCENT: 0 %
GFR AFRICAN AMERICAN: >60
GFR NON-AFRICAN AMERICAN: >60
GLUCOSE BLD-MCNC: 154 MG/DL (ref 70–99)
HCT VFR BLD CALC: 38.9 % (ref 40.5–52.5)
HEMOGLOBIN: 12.8 G/DL (ref 13.5–17.5)
LYMPHOCYTES ABSOLUTE: 0.4 K/UL (ref 1–5.1)
LYMPHOCYTES RELATIVE PERCENT: 2 %
MAGNESIUM: 2.3 MG/DL (ref 1.8–2.4)
MCH RBC QN AUTO: 31.8 PG (ref 26–34)
MCHC RBC AUTO-ENTMCNC: 32.9 G/DL (ref 31–36)
MCV RBC AUTO: 96.6 FL (ref 80–100)
METAMYELOCYTES RELATIVE PERCENT: 4 %
MONOCYTES ABSOLUTE: 0.8 K/UL (ref 0–1.3)
MONOCYTES RELATIVE PERCENT: 4 %
NEUTROPHILS ABSOLUTE: 19.8 K/UL (ref 1.7–7.7)
NEUTROPHILS RELATIVE PERCENT: 87 %
PDW BLD-RTO: 15 % (ref 12.4–15.4)
PHOSPHORUS: 3.2 MG/DL (ref 2.5–4.9)
PLATELET # BLD: 210 K/UL (ref 135–450)
PLATELET SLIDE REVIEW: ADEQUATE
PMV BLD AUTO: 10.5 FL (ref 5–10.5)
POIKILOCYTES: ABNORMAL
POTASSIUM SERPL-SCNC: 4.5 MMOL/L (ref 3.5–5.1)
RBC # BLD: 4.03 M/UL (ref 4.2–5.9)
SLIDE REVIEW: ABNORMAL
SODIUM BLD-SCNC: 141 MMOL/L (ref 136–145)
WBC # BLD: 21.1 K/UL (ref 4–11)

## 2018-10-19 PROCEDURE — C9113 INJ PANTOPRAZOLE SODIUM, VIA: HCPCS | Performed by: INTERNAL MEDICINE

## 2018-10-19 PROCEDURE — 36415 COLL VENOUS BLD VENIPUNCTURE: CPT

## 2018-10-19 PROCEDURE — 2580000003 HC RX 258: Performed by: INTERNAL MEDICINE

## 2018-10-19 PROCEDURE — 97110 THERAPEUTIC EXERCISES: CPT

## 2018-10-19 PROCEDURE — 94640 AIRWAY INHALATION TREATMENT: CPT

## 2018-10-19 PROCEDURE — 2060000000 HC ICU INTERMEDIATE R&B

## 2018-10-19 PROCEDURE — 97530 THERAPEUTIC ACTIVITIES: CPT

## 2018-10-19 PROCEDURE — 6370000000 HC RX 637 (ALT 250 FOR IP): Performed by: INTERNAL MEDICINE

## 2018-10-19 PROCEDURE — 6360000002 HC RX W HCPCS: Performed by: NURSE PRACTITIONER

## 2018-10-19 PROCEDURE — 94762 N-INVAS EAR/PLS OXIMTRY CONT: CPT

## 2018-10-19 PROCEDURE — G8978 MOBILITY CURRENT STATUS: HCPCS

## 2018-10-19 PROCEDURE — G8979 MOBILITY GOAL STATUS: HCPCS

## 2018-10-19 PROCEDURE — 2700000000 HC OXYGEN THERAPY PER DAY

## 2018-10-19 PROCEDURE — 85025 COMPLETE CBC W/AUTO DIFF WBC: CPT

## 2018-10-19 PROCEDURE — 6360000002 HC RX W HCPCS: Performed by: INTERNAL MEDICINE

## 2018-10-19 PROCEDURE — 6370000000 HC RX 637 (ALT 250 FOR IP): Performed by: NURSE PRACTITIONER

## 2018-10-19 PROCEDURE — 84100 ASSAY OF PHOSPHORUS: CPT

## 2018-10-19 PROCEDURE — 99232 SBSQ HOSP IP/OBS MODERATE 35: CPT | Performed by: INTERNAL MEDICINE

## 2018-10-19 PROCEDURE — 80048 BASIC METABOLIC PNL TOTAL CA: CPT

## 2018-10-19 PROCEDURE — 83735 ASSAY OF MAGNESIUM: CPT

## 2018-10-19 RX ORDER — PREDNISONE 20 MG/1
40 TABLET ORAL DAILY
Status: DISCONTINUED | OUTPATIENT
Start: 2018-10-19 | End: 2018-10-21 | Stop reason: HOSPADM

## 2018-10-19 RX ADMIN — Medication 10 ML: at 09:16

## 2018-10-19 RX ADMIN — LOSARTAN POTASSIUM 100 MG: 50 TABLET ORAL at 09:16

## 2018-10-19 RX ADMIN — PANTOPRAZOLE SODIUM 40 MG: 40 INJECTION, POWDER, FOR SOLUTION INTRAVENOUS at 21:32

## 2018-10-19 RX ADMIN — THEOPHYLLINE 400 MG: 400 TABLET, EXTENDED RELEASE ORAL at 09:16

## 2018-10-19 RX ADMIN — ALBUTEROL SULFATE 2.5 MG: 2.5 SOLUTION RESPIRATORY (INHALATION) at 08:17

## 2018-10-19 RX ADMIN — FORMOTEROL FUMARATE DIHYDRATE 20 MCG: 20 SOLUTION RESPIRATORY (INHALATION) at 19:42

## 2018-10-19 RX ADMIN — ASPIRIN 81 MG: 81 TABLET, COATED ORAL at 09:16

## 2018-10-19 RX ADMIN — ALBUTEROL SULFATE 2.5 MG: 2.5 SOLUTION RESPIRATORY (INHALATION) at 12:09

## 2018-10-19 RX ADMIN — METOPROLOL TARTRATE 25 MG: 25 TABLET ORAL at 21:32

## 2018-10-19 RX ADMIN — PANTOPRAZOLE SODIUM 40 MG: 40 INJECTION, POWDER, FOR SOLUTION INTRAVENOUS at 09:16

## 2018-10-19 RX ADMIN — Medication 10 ML: at 21:33

## 2018-10-19 RX ADMIN — Medication 10 ML: at 09:17

## 2018-10-19 RX ADMIN — BUDESONIDE 1000 MCG: 0.5 SUSPENSION RESPIRATORY (INHALATION) at 19:42

## 2018-10-19 RX ADMIN — FORMOTEROL FUMARATE DIHYDRATE 20 MCG: 20 SOLUTION RESPIRATORY (INHALATION) at 08:17

## 2018-10-19 RX ADMIN — TAMSULOSIN HYDROCHLORIDE 0.4 MG: 0.4 CAPSULE ORAL at 09:16

## 2018-10-19 RX ADMIN — ALBUTEROL SULFATE 2.5 MG: 2.5 SOLUTION RESPIRATORY (INHALATION) at 16:14

## 2018-10-19 RX ADMIN — METHYLPREDNISOLONE SODIUM SUCCINATE 40 MG: 40 INJECTION, POWDER, FOR SOLUTION INTRAMUSCULAR; INTRAVENOUS at 06:21

## 2018-10-19 RX ADMIN — ALBUTEROL SULFATE 2.5 MG: 2.5 SOLUTION RESPIRATORY (INHALATION) at 19:42

## 2018-10-19 RX ADMIN — TIOTROPIUM BROMIDE 18 MCG: 18 CAPSULE ORAL; RESPIRATORY (INHALATION) at 08:17

## 2018-10-19 RX ADMIN — PREDNISONE 40 MG: 20 TABLET ORAL at 09:27

## 2018-10-19 RX ADMIN — BUDESONIDE 1000 MCG: 0.5 SUSPENSION RESPIRATORY (INHALATION) at 08:17

## 2018-10-19 RX ADMIN — ENOXAPARIN SODIUM 40 MG: 40 INJECTION SUBCUTANEOUS at 09:16

## 2018-10-19 RX ADMIN — METOPROLOL TARTRATE 25 MG: 25 TABLET ORAL at 09:16

## 2018-10-19 ASSESSMENT — PAIN SCALES - GENERAL
PAINLEVEL_OUTOF10: 0

## 2018-10-19 NOTE — CARE COORDINATION
Per AM rounds with Dr Monalisa Castillo, hoping to dc on Sunday but [facility]    I called nurse triage line [phone] who states Chevy Ocampo will not be pcp because pt missed appointment d/t hospitalization. Pt has now been reassigned but the triage nurse is going to have the  at [facility] call me. I reiterated the plan is dc Sunday. Rec'd call from [facility] transfer center-[facility] asking  If pt is d/c'd. Explained we are hopeful for transfer on Sunday w/hhc (PT/OT/RN), O2, and Bipap. He is requesting pulmonary note and abg be faxed to [phone] (faxed)    Pt's PCP  is now Mabel Spicer at [phone]. Notified Linette Bearden in admitting who will update records    [address] community care coordinator at [phone]. Spoke to Terri Keller, transferred to [place], [address], who can't help me and will need orders from pcp. Pt's  from PCP is Stefanie Peres at [phone]. Left  w/my call back number. I spoke to Henna Covintgon, at [phone] who states pt qualifies for NIV and will be by to  face sheet, pulmonary note and abg and she will work on getting it covered by [facility]. Tincarolyn Darlene states pt can be set up for home NIV on Sunday using his Medicare benefits and he will qualify for a hardship benefit making his copay $0.      1505: Rec'd call back from PCP  Rose Marie who states she has been working another care coordinator for to NIV. Spoke to Jude Lozano (copd coordinator) at [phone] who has been working with the [facility] to get the NIV set up. This can't be arranged through the [facility] until Monday. I called and left the pcp  a message stating pt will not be d/c'd until Monday. 1528:  Rec'd call from Harika Edmond, who states she spoke to the copd coordinator and now the referral to the [facility] has been cancelled for NIV. Spoke to [place], , who took the information r/t home care over the phone. I faxed H&P and derek to her a [phone].  Home care will contact pt to set up appointment.     Will need [facility] order and derek    Deepali Hernández, RN  Case management  [phone]

## 2018-10-19 NOTE — PROGRESS NOTES
lethargic during my exam, hence history obtained from spouse/ER records. Pt gets his care at the [facility], apparently had some \"viral URI\" and was admitted at [facility] from Sunday and was discharged from the hospital yesterday to home on PO steroids. Spouse states that pt cont to feel SOB yesterday after he got home, uses 3L O2 at baseline and needed to be increased to 4L with ambulation but even then pt pt found it hard to breathe. This am, he was very weak and SOB and hence squad was called and pt was brought to the ER. ABG revealed hypercapnia and hence pt placed on BIPAP and admitted. Was very anxious on the BIPAP and received ativan and pt very lethargic at present\"(per note Dr Jerrod Prince 10/11/18)  Family / Caregiver Present: No  Referring Practitioner: Jerrod Prince  Diagnosis: hypercapnia  Subjective  Subjective: Pt met up in chair . Pt dressed in his own shirt and PJ pants. Pt proudly announced he was  down to 9 L O2 per NC. Pt agreeable to OT and requests to try ADL mobility rather than grooming tasks today. Pt will  receive resp tx prior to amb. Pt seen by resp therapist prior to resuming OT. Pt now down to 5 L  O2 . General Comment  Comments: 5 L O2 per NC- O2 sats remained between 90% and 93% while ambulating w/ RW in room for 350 feet total at an easy pace with occasional standing rest breaks -( primarily to maintain lines,). Most common sat was 92%- at no time did sats drop below 90%. Pt talking while walking as well.   Pain Assessment  Patient Currently in Pain: Denies  Pain Assessment: 0-10  Pain Level: 0  RASS Score (Ventilated): Alert and calm  Oxygen Therapy  SpO2: 96 %  Pulse Oximeter Device Mode: Continuous  Pulse Oximeter Device Location: Finger  O2 Device: Nasal cannula  O2 Flow Rate (L/min): 4.5 L/min  Social/Functional History  Social/Functional History  Lives With: Spouse  Type of Home: House  Home Layout: One level  Home Access: Stairs to enter with rails  Entrance Stairs - Number of Steps: 1 nery Tolerated treatment well  Activity Tolerance: ambulated community distance today on 5-6 L over 600 feet sats decraesed to 88 % just at last 1/4 of walk-quick retrun to 90s. Safety Devices  Type of devices:  (pt working with PT at close of OT session.)         Plan   Plan  Times per week: 3-5 times per week  Times per day: Daily  Plan weeks: until discharges  Current Treatment Recommendations: Strengthening, Endurance Training, Patient/Caregiver Education & Training, Self-Care / ADL, Balance Training, Functional Mobility Training, Safety Education & Training, Equipment Evaluation, Education, & procurement  Plan Comment: D/C Acute care OT, will have home OT at d/C from hosp. G-Code  OT G-codes  Score: 21 CJ am pac adl   OutComes Score    AM-PAC Score     AM-PAC Inpatient Daily Activity Raw Score: 21  AM-PAC Inpatient ADL T-Scale Score : 44.27  ADL Inpatient CMS 0-100% Score: 32.79  ADL Inpatient CMS G-Code Modifier : CJ    Goals  Short term goals  Time Frame for Short term goals: status: goals  progressing very well- goals 75% achieved. Recommend home OT. Expect pt to be D/C this weekend when his Bi pap is available for home. Short term goal 1: self care indep with oxygen . ..(status 10/17 today on 15 L per NC high flow sats varied from 76% to 96%, most often in high 80S to low 90s)  Short term goal 2: transfers indep with oxygen, no AD --- 10/18 needed RW and only 5 L O2  Short term goal 3: funcitonal mobility indep with oxygen , no AD   ---10/18 needed RW and only 5 L O2  Short term goal 4: increased activity tolerance, use of pursed lip breathing to maintain adequate oxygen sats. ...  status 10/17 cues to stop for rests and PLB- needs cues tyo rest particularly when talking  Long term goals  Time Frame for Long term goals : same as stg  Patient Goals   Patient goals : \"I just want to get stronger to get out of here \"  Agreed he wanted to be able to get up and walk do his own personal care\"       Therapy Time

## 2018-10-19 NOTE — PROGRESS NOTES
precautions in place, Call light within reach, Gait belt, Left in chair (RN and RT providing care to pt when PT was completed)     Therapy Time   Individual Concurrent Group Co-treatment   Time In 1555         Time Out 1620         Minutes 25         Timed Code Treatment Minutes: 25 Minutes     If pt is discharged before subsequent therapy sessions, this note will serve as the discharge summary.     Lona Hodges, [address]

## 2018-10-19 NOTE — PROGRESS NOTES
at home. Pt verbalizes that he will do treatments 3 x a day, states he will quit smoking and also will space activities to prevent dyspnea. 1800 Notified by RT that Kristopher Perrin did not approve Bi-pap for pt due to Insurance. [facility] for [facility] and expected time is Monday. Perfect serve message sent to Dr. Minh Benitez to inform. [phone] with pt and explained plan for Bi-pap. Called wife and also explained plan. Pt served dinner tray. Call light and belongings in reach.   1925 Report given to Jose Warren RN  Electronically signed by Maryann Khan RN on 10/19/2018 at 7:38 PM

## 2018-10-20 LAB
ACANTHOCYTES: ABNORMAL
ANION GAP SERPL CALCULATED.3IONS-SCNC: 11 MMOL/L (ref 3–16)
BANDED NEUTROPHILS RELATIVE PERCENT: 1 % (ref 0–7)
BASOPHILS ABSOLUTE: 0 K/UL (ref 0–0.2)
BASOPHILS RELATIVE PERCENT: 0 %
BUN BLDV-MCNC: 23 MG/DL (ref 7–20)
BURR CELLS: ABNORMAL
CALCIUM SERPL-MCNC: 8.3 MG/DL (ref 8.3–10.6)
CHLORIDE BLD-SCNC: 103 MMOL/L (ref 99–110)
CO2: 29 MMOL/L (ref 21–32)
CREAT SERPL-MCNC: 0.8 MG/DL (ref 0.8–1.3)
CRENATED RBC'S: ABNORMAL
EOSINOPHILS ABSOLUTE: 0 K/UL (ref 0–0.6)
EOSINOPHILS RELATIVE PERCENT: 0 %
GFR AFRICAN AMERICAN: >60
GFR NON-AFRICAN AMERICAN: >60
GLUCOSE BLD-MCNC: 99 MG/DL (ref 70–99)
HCT VFR BLD CALC: 37.6 % (ref 40.5–52.5)
HEMOGLOBIN: 12.2 G/DL (ref 13.5–17.5)
LYMPHOCYTES ABSOLUTE: 2.4 K/UL (ref 1–5.1)
LYMPHOCYTES RELATIVE PERCENT: 11 %
MAGNESIUM: 2.2 MG/DL (ref 1.8–2.4)
MCH RBC QN AUTO: 31 PG (ref 26–34)
MCHC RBC AUTO-ENTMCNC: 32.5 G/DL (ref 31–36)
MCV RBC AUTO: 95.2 FL (ref 80–100)
METAMYELOCYTES RELATIVE PERCENT: 2 %
MONOCYTES ABSOLUTE: 1.5 K/UL (ref 0–1.3)
MONOCYTES RELATIVE PERCENT: 7 %
NEUTROPHILS ABSOLUTE: 17.7 K/UL (ref 1.7–7.7)
NEUTROPHILS RELATIVE PERCENT: 79 %
OVALOCYTES: ABNORMAL
PDW BLD-RTO: 15.2 % (ref 12.4–15.4)
PHOSPHORUS: 3.3 MG/DL (ref 2.5–4.9)
PLATELET # BLD: 193 K/UL (ref 135–450)
PLATELET SLIDE REVIEW: ADEQUATE
PMV BLD AUTO: 10.4 FL (ref 5–10.5)
POIKILOCYTES: ABNORMAL
POTASSIUM SERPL-SCNC: 4.2 MMOL/L (ref 3.5–5.1)
RBC # BLD: 3.95 M/UL (ref 4.2–5.9)
SLIDE REVIEW: ABNORMAL
SODIUM BLD-SCNC: 143 MMOL/L (ref 136–145)
WBC # BLD: 21.6 K/UL (ref 4–11)

## 2018-10-20 PROCEDURE — 2700000000 HC OXYGEN THERAPY PER DAY

## 2018-10-20 PROCEDURE — 83735 ASSAY OF MAGNESIUM: CPT

## 2018-10-20 PROCEDURE — 2580000003 HC RX 258: Performed by: INTERNAL MEDICINE

## 2018-10-20 PROCEDURE — 6360000002 HC RX W HCPCS: Performed by: INTERNAL MEDICINE

## 2018-10-20 PROCEDURE — 85025 COMPLETE CBC W/AUTO DIFF WBC: CPT

## 2018-10-20 PROCEDURE — C9113 INJ PANTOPRAZOLE SODIUM, VIA: HCPCS | Performed by: INTERNAL MEDICINE

## 2018-10-20 PROCEDURE — 36415 COLL VENOUS BLD VENIPUNCTURE: CPT

## 2018-10-20 PROCEDURE — 2060000000 HC ICU INTERMEDIATE R&B

## 2018-10-20 PROCEDURE — 99232 SBSQ HOSP IP/OBS MODERATE 35: CPT | Performed by: INTERNAL MEDICINE

## 2018-10-20 PROCEDURE — 6370000000 HC RX 637 (ALT 250 FOR IP): Performed by: INTERNAL MEDICINE

## 2018-10-20 PROCEDURE — 84100 ASSAY OF PHOSPHORUS: CPT

## 2018-10-20 PROCEDURE — 94762 N-INVAS EAR/PLS OXIMTRY CONT: CPT

## 2018-10-20 PROCEDURE — 6360000002 HC RX W HCPCS: Performed by: NURSE PRACTITIONER

## 2018-10-20 PROCEDURE — 94660 CPAP INITIATION&MGMT: CPT

## 2018-10-20 PROCEDURE — 6370000000 HC RX 637 (ALT 250 FOR IP): Performed by: NURSE PRACTITIONER

## 2018-10-20 PROCEDURE — 94640 AIRWAY INHALATION TREATMENT: CPT

## 2018-10-20 PROCEDURE — 80048 BASIC METABOLIC PNL TOTAL CA: CPT

## 2018-10-20 RX ADMIN — ALBUTEROL SULFATE 2.5 MG: 2.5 SOLUTION RESPIRATORY (INHALATION) at 12:30

## 2018-10-20 RX ADMIN — THEOPHYLLINE 400 MG: 400 TABLET, EXTENDED RELEASE ORAL at 08:24

## 2018-10-20 RX ADMIN — PANTOPRAZOLE SODIUM 40 MG: 40 INJECTION, POWDER, FOR SOLUTION INTRAVENOUS at 20:29

## 2018-10-20 RX ADMIN — LOSARTAN POTASSIUM 100 MG: 50 TABLET ORAL at 08:24

## 2018-10-20 RX ADMIN — PANTOPRAZOLE SODIUM 40 MG: 40 INJECTION, POWDER, FOR SOLUTION INTRAVENOUS at 08:25

## 2018-10-20 RX ADMIN — BUDESONIDE 1000 MCG: 0.5 SUSPENSION RESPIRATORY (INHALATION) at 08:44

## 2018-10-20 RX ADMIN — Medication 10 ML: at 20:30

## 2018-10-20 RX ADMIN — Medication 10 ML: at 20:29

## 2018-10-20 RX ADMIN — PREDNISONE 40 MG: 20 TABLET ORAL at 08:24

## 2018-10-20 RX ADMIN — TAMSULOSIN HYDROCHLORIDE 0.4 MG: 0.4 CAPSULE ORAL at 08:24

## 2018-10-20 RX ADMIN — ALBUTEROL SULFATE 2.5 MG: 2.5 SOLUTION RESPIRATORY (INHALATION) at 20:38

## 2018-10-20 RX ADMIN — Medication 10 ML: at 08:30

## 2018-10-20 RX ADMIN — ALBUTEROL SULFATE 2.5 MG: 2.5 SOLUTION RESPIRATORY (INHALATION) at 16:04

## 2018-10-20 RX ADMIN — FORMOTEROL FUMARATE DIHYDRATE 20 MCG: 20 SOLUTION RESPIRATORY (INHALATION) at 20:38

## 2018-10-20 RX ADMIN — ALBUTEROL SULFATE 2.5 MG: 2.5 SOLUTION RESPIRATORY (INHALATION) at 08:44

## 2018-10-20 RX ADMIN — FORMOTEROL FUMARATE DIHYDRATE 20 MCG: 20 SOLUTION RESPIRATORY (INHALATION) at 08:44

## 2018-10-20 RX ADMIN — METOPROLOL TARTRATE 25 MG: 25 TABLET ORAL at 08:24

## 2018-10-20 RX ADMIN — Medication 10 ML: at 07:26

## 2018-10-20 RX ADMIN — TIOTROPIUM BROMIDE 18 MCG: 18 CAPSULE ORAL; RESPIRATORY (INHALATION) at 08:47

## 2018-10-20 RX ADMIN — METOPROLOL TARTRATE 25 MG: 25 TABLET ORAL at 20:29

## 2018-10-20 RX ADMIN — BUDESONIDE 1000 MCG: 0.5 SUSPENSION RESPIRATORY (INHALATION) at 20:38

## 2018-10-20 RX ADMIN — ASPIRIN 81 MG: 81 TABLET, COATED ORAL at 08:24

## 2018-10-20 RX ADMIN — ENOXAPARIN SODIUM 40 MG: 40 INJECTION SUBCUTANEOUS at 08:27

## 2018-10-20 ASSESSMENT — PAIN SCALES - GENERAL
PAINLEVEL_OUTOF10: 0

## 2018-10-20 NOTE — PROGRESS NOTES
acute disease. Assessment:       COPD  Acute hypoxemic respiratory failure  2 hypercapnic respiratory failure  Blood culture positive for strep viridans 102  History of aortic valve replacement  History of splenectomy  History of blood clots in the arm, unclear if DVT or SVT      Plan:      Bacteremia  - Antibiotics per infectious disease    COPD  -  pred plan to d/c with 12 days of pred. .  -  budesonide  1 g twice a day ,  Perforomist  - Spiriva  - Theophylline,   - planning d/c on Sunday. - plan to get bipap Monday from [place]. Paperwork completed        Acute hypoxemia  - appears to be getting better. - plan to d/c on 5L NC. He will Wean O2 to sat >90%     Hypertension  - Clonidine  - Home dose 25 mg twice a day. This note was transcribed using [id number] [employer]. Please disregard any translational errors.       Daren Tian Pulmonary, Sleep and [facility] [phone]

## 2018-10-21 VITALS
WEIGHT: 208.56 LBS | HEART RATE: 98 BPM | RESPIRATION RATE: 18 BRPM | OXYGEN SATURATION: 94 % | HEIGHT: 68 IN | DIASTOLIC BLOOD PRESSURE: 79 MMHG | TEMPERATURE: 98.3 F | BODY MASS INDEX: 31.61 KG/M2 | SYSTOLIC BLOOD PRESSURE: 127 MMHG

## 2018-10-21 LAB
ACANTHOCYTES: ABNORMAL
ANION GAP SERPL CALCULATED.3IONS-SCNC: 9 MMOL/L (ref 3–16)
ANISOCYTOSIS: ABNORMAL
BANDED NEUTROPHILS RELATIVE PERCENT: 6 % (ref 0–7)
BASOPHILS ABSOLUTE: 0 K/UL (ref 0–0.2)
BASOPHILS RELATIVE PERCENT: 0 %
BUN BLDV-MCNC: 18 MG/DL (ref 7–20)
BURR CELLS: ABNORMAL
CALCIUM SERPL-MCNC: 8.3 MG/DL (ref 8.3–10.6)
CHLORIDE BLD-SCNC: 102 MMOL/L (ref 99–110)
CO2: 27 MMOL/L (ref 21–32)
CREAT SERPL-MCNC: 0.7 MG/DL (ref 0.8–1.3)
EOSINOPHILS ABSOLUTE: 0 K/UL (ref 0–0.6)
EOSINOPHILS RELATIVE PERCENT: 0 %
GFR AFRICAN AMERICAN: >60
GFR NON-AFRICAN AMERICAN: >60
GLUCOSE BLD-MCNC: 86 MG/DL (ref 70–99)
HCT VFR BLD CALC: 37 % (ref 40.5–52.5)
HEMOGLOBIN: 12.2 G/DL (ref 13.5–17.5)
LYMPHOCYTES ABSOLUTE: 3.6 K/UL (ref 1–5.1)
LYMPHOCYTES RELATIVE PERCENT: 17 %
MAGNESIUM: 2.2 MG/DL (ref 1.8–2.4)
MCH RBC QN AUTO: 31.6 PG (ref 26–34)
MCHC RBC AUTO-ENTMCNC: 33.1 G/DL (ref 31–36)
MCV RBC AUTO: 95.7 FL (ref 80–100)
METAMYELOCYTES RELATIVE PERCENT: 3 %
MONOCYTES ABSOLUTE: 1.7 K/UL (ref 0–1.3)
MONOCYTES RELATIVE PERCENT: 8 %
MYELOCYTE PERCENT: 1 %
NEUTROPHILS ABSOLUTE: 15.7 K/UL (ref 1.7–7.7)
NEUTROPHILS RELATIVE PERCENT: 65 %
OVALOCYTES: ABNORMAL
PDW BLD-RTO: 15.1 % (ref 12.4–15.4)
PHOSPHORUS: 3.1 MG/DL (ref 2.5–4.9)
PLATELET # BLD: 191 K/UL (ref 135–450)
PLATELET SLIDE REVIEW: ADEQUATE
PMV BLD AUTO: 10.3 FL (ref 5–10.5)
POIKILOCYTES: ABNORMAL
POTASSIUM SERPL-SCNC: 4.2 MMOL/L (ref 3.5–5.1)
RBC # BLD: 3.87 M/UL (ref 4.2–5.9)
SLIDE REVIEW: ABNORMAL
SODIUM BLD-SCNC: 138 MMOL/L (ref 136–145)
TOXIC GRANULATION: PRESENT
VACUOLATED NEUTROPHILS: PRESENT
WBC # BLD: 20.9 K/UL (ref 4–11)

## 2018-10-21 PROCEDURE — C9113 INJ PANTOPRAZOLE SODIUM, VIA: HCPCS | Performed by: INTERNAL MEDICINE

## 2018-10-21 PROCEDURE — 94660 CPAP INITIATION&MGMT: CPT

## 2018-10-21 PROCEDURE — 6370000000 HC RX 637 (ALT 250 FOR IP): Performed by: NURSE PRACTITIONER

## 2018-10-21 PROCEDURE — 6360000002 HC RX W HCPCS: Performed by: INTERNAL MEDICINE

## 2018-10-21 PROCEDURE — 85025 COMPLETE CBC W/AUTO DIFF WBC: CPT

## 2018-10-21 PROCEDURE — 6370000000 HC RX 637 (ALT 250 FOR IP): Performed by: INTERNAL MEDICINE

## 2018-10-21 PROCEDURE — 94640 AIRWAY INHALATION TREATMENT: CPT

## 2018-10-21 PROCEDURE — 80048 BASIC METABOLIC PNL TOTAL CA: CPT

## 2018-10-21 PROCEDURE — 99231 SBSQ HOSP IP/OBS SF/LOW 25: CPT | Performed by: INTERNAL MEDICINE

## 2018-10-21 PROCEDURE — 84100 ASSAY OF PHOSPHORUS: CPT

## 2018-10-21 PROCEDURE — 94761 N-INVAS EAR/PLS OXIMETRY MLT: CPT

## 2018-10-21 PROCEDURE — 83735 ASSAY OF MAGNESIUM: CPT

## 2018-10-21 PROCEDURE — 2700000000 HC OXYGEN THERAPY PER DAY

## 2018-10-21 PROCEDURE — 6360000002 HC RX W HCPCS: Performed by: NURSE PRACTITIONER

## 2018-10-21 RX ORDER — PREDNISONE 10 MG/1
10-40 TABLET ORAL DAILY
Qty: 30 TABLET | Refills: 0 | Status: ON HOLD | OUTPATIENT
Start: 2018-10-21 | End: 2019-06-10

## 2018-10-21 RX ADMIN — PREDNISONE 40 MG: 20 TABLET ORAL at 08:29

## 2018-10-21 RX ADMIN — METOPROLOL TARTRATE 25 MG: 25 TABLET ORAL at 08:29

## 2018-10-21 RX ADMIN — ASPIRIN 81 MG: 81 TABLET, COATED ORAL at 08:28

## 2018-10-21 RX ADMIN — PANTOPRAZOLE SODIUM 40 MG: 40 INJECTION, POWDER, FOR SOLUTION INTRAVENOUS at 08:30

## 2018-10-21 RX ADMIN — LOSARTAN POTASSIUM 100 MG: 50 TABLET ORAL at 08:28

## 2018-10-21 RX ADMIN — ENOXAPARIN SODIUM 40 MG: 40 INJECTION SUBCUTANEOUS at 08:29

## 2018-10-21 RX ADMIN — ALBUTEROL SULFATE 2.5 MG: 2.5 SOLUTION RESPIRATORY (INHALATION) at 08:33

## 2018-10-21 RX ADMIN — TIOTROPIUM BROMIDE 18 MCG: 18 CAPSULE ORAL; RESPIRATORY (INHALATION) at 08:35

## 2018-10-21 RX ADMIN — BUDESONIDE 1000 MCG: 0.5 SUSPENSION RESPIRATORY (INHALATION) at 08:34

## 2018-10-21 RX ADMIN — TAMSULOSIN HYDROCHLORIDE 0.4 MG: 0.4 CAPSULE ORAL at 08:29

## 2018-10-21 RX ADMIN — THEOPHYLLINE 400 MG: 400 TABLET, EXTENDED RELEASE ORAL at 08:28

## 2018-10-21 RX ADMIN — FORMOTEROL FUMARATE DIHYDRATE 20 MCG: 20 SOLUTION RESPIRATORY (INHALATION) at 08:34

## 2018-10-21 NOTE — PROGRESS NOTES
Reviewed med list and dc instructions. Verbalized understanding. family at bedside. Follow up information given. Patient dc via wc to home.

## 2018-10-21 NOTE — DISCHARGE SUMMARY
Hospital Medicine  Discharge Summary    Debbie Mo       :  1950              MRN:  [id number]    Admit date:  10/11/2018    Discharge date:  10/21/2018    Admitting Physician:  Amirah Iverson MD  Discharge Physician: Kaleta Aase, MD      Discharge Diagnoses:    Patient Active Problem List   Diagnosis    [facility]    Acute hypercapnic respiratory failure ([facility]    Lethargy    Essential hypertension    Streptococcal bacteremia    H/O splenectomy    Positive blood culture       Admission Condition:  serious    Discharged Condition:  stable    The patient was seen and examined on day of discharge and this discharge summary is in conjunction with any daily progress note from day of discharge. History of Present Illness: The patient is a 80 y.o. male who presents to [facility] with SOB. Pt on BIPAP and lethargic during my exam, hence history obtained from spouse/ER records. Pt gets his care at the [place], apparently had some \"viral URI\" and was admitted at [place] from  and was discharged from the hospital yesterday to home on PO steroids. Spouse states that pt cont to feel SOB yesterday after he got home, uses 3L O2 at baseline and needed to be increased to 4L with ambulation but even then pt pt found it hard to breathe. This am, he was very weak and SOB and hence squad was called and pt was brought to the ER. ABG revealed hypercapnia and hence pt placed on BIPAP and admitted. Was very anxious on the BIPAP and received ativan and pt very lethargic at present. Hospital Course:    Patient was admitted to the hospital and then started on supportive care with improvement in symptoms and please see below for further information. Acute on chronic hypoxic and hypercarbic respiratory failure: Secondary to acute on chronic COPD exacerbation.  On 3L/min of oxygen at baseline.    -Need nightly BiPAP and will  at the [place] in a.m   -Nebs   -O2   -s/p instructions.    Danis Breaker   Home Medication Instructions RST:[id number]    Printed on:10/21/18 1022   Medication Information                      albuterol (PROVENTIL) (2.5 MG/3ML) 0.083% nebulizer solution  Take 2.5 mg by nebulization every 6 hours as needed for Wheezing             albuterol sulfate  (90 Base) MCG/ACT inhaler  Inhale 2 puffs into the lungs every 6 hours as needed for Wheezing             aspirin EC 81 MG EC tablet  Take 81 mg by mouth daily             atorvastatin (LIPITOR) 40 MG tablet  Take 20 mg by mouth nightly              budesonide-formoterol (SYMBICORT) 80-4.5 MCG/ACT AERO  Inhale 2 puffs into the lungs 2 times daily             guaiFENesin-dextromethorphan (ROBITUSSIN DM) 100-10 MG/5ML syrup  Take 5 mLs by mouth 4 times daily as needed for Cough             losartan (COZAAR) 100 MG tablet  Take 50 mg by mouth daily              metoprolol tartrate (LOPRESSOR) 25 MG tablet  Take 25 mg by mouth 2 times daily             nicotine (NICODERM CQ) 21 MG/24HR  Place 1 patch onto the skin every 24 hours             nicotine polacrilex (COMMIT) 2 MG lozenge  Take 2 mg by mouth as needed for Smoking cessation             omeprazole (PRILOSEC) 20 MG delayed release capsule  Take 20 mg by mouth daily             predniSONE (DELTASONE) 10 MG tablet  Take 1-4 tablets by mouth daily Starting 10/21 - prednisone taper 40 mg x 3, 30 mg x 3, 20 mg x 3, then 10 mg x 3             Roflumilast (DALIRESP) 500 MCG tablet  Take 500 mcg by mouth daily             tamsulosin (FLOMAX) 0.4 MG capsule  Take 0.4 mg by mouth nightly              tiotropium (SPIRIVA) 18 MCG inhalation capsule  Inhale 18 mcg into the lungs daily                 Consults:    IP CONSULT TO HOSPITALIST  IP CONSULT TO PULMONOLOGY  IP CONSULT TO INFECTIOUS DISEASES  IP CONSULT TO COPD NURSE    PCP: [facility]    Significant Diagnostic Studies:    CT CHEST PULMONARY EMBOLISM W CONTRAST [[id number]] Collected: 10/15/18 104

## 2018-10-22 NOTE — CARE COORDINATION
Mario faxed patient's home care order, AVS/MARIELA, facesheet, and dc summary to [place] to [facility].     Electronically signed by Alistair Browne on 10/22/2018 at 9:05 AM

## 2019-06-09 ENCOUNTER — APPOINTMENT (OUTPATIENT)
Dept: GENERAL RADIOLOGY | Age: 69
DRG: 699 | End: 2019-06-09
Payer: OTHER GOVERNMENT

## 2019-06-09 ENCOUNTER — HOSPITAL ENCOUNTER (INPATIENT)
Age: 69
LOS: 3 days | Discharge: HOME HEALTH | DRG: 699 | End: 2019-06-12
Attending: EMERGENCY MEDICINE | Admitting: INTERNAL MEDICINE
Payer: OTHER GOVERNMENT

## 2019-06-09 DIAGNOSIS — J44.1 COPD EXACERBATION (HCC): Primary | ICD-10-CM

## 2019-06-09 DIAGNOSIS — N39.0 URINARY TRACT INFECTION WITH HEMATURIA, SITE UNSPECIFIED: ICD-10-CM

## 2019-06-09 DIAGNOSIS — R31.9 URINARY TRACT INFECTION WITH HEMATURIA, SITE UNSPECIFIED: ICD-10-CM

## 2019-06-09 DIAGNOSIS — K92.0 HEMATEMESIS WITH NAUSEA: ICD-10-CM

## 2019-06-09 LAB
A/G RATIO: 0.8 (ref 1.1–2.2)
ABO/RH: NORMAL
ALBUMIN SERPL-MCNC: 3.6 G/DL (ref 3.4–5)
ALP BLD-CCNC: 110 U/L (ref 40–129)
ALT SERPL-CCNC: 23 U/L (ref 10–40)
ANION GAP SERPL CALCULATED.3IONS-SCNC: 14 MMOL/L (ref 3–16)
ANTIBODY SCREEN: NORMAL
AST SERPL-CCNC: 43 U/L (ref 15–37)
BACTERIA: ABNORMAL /HPF
BASOPHILS ABSOLUTE: 0.2 K/UL (ref 0–0.2)
BASOPHILS RELATIVE PERCENT: 1.4 %
BILIRUB SERPL-MCNC: <0.2 MG/DL (ref 0–1)
BILIRUBIN URINE: ABNORMAL
BLOOD, URINE: ABNORMAL
BUN BLDV-MCNC: 14 MG/DL (ref 7–20)
CALCIUM SERPL-MCNC: 10.7 MG/DL (ref 8.3–10.6)
CHLORIDE BLD-SCNC: 91 MMOL/L (ref 99–110)
CLARITY: ABNORMAL
CO2: 30 MMOL/L (ref 21–32)
COLOR: YELLOW
CREAT SERPL-MCNC: 0.8 MG/DL (ref 0.8–1.3)
EKG ATRIAL RATE: 61 BPM
EKG DIAGNOSIS: NORMAL
EKG P AXIS: 47 DEGREES
EKG P-R INTERVAL: 196 MS
EKG Q-T INTERVAL: 440 MS
EKG QRS DURATION: 102 MS
EKG QTC CALCULATION (BAZETT): 442 MS
EKG R AXIS: 45 DEGREES
EKG T AXIS: 58 DEGREES
EKG VENTRICULAR RATE: 61 BPM
EOSINOPHILS ABSOLUTE: 0.1 K/UL (ref 0–0.6)
EOSINOPHILS RELATIVE PERCENT: 0.7 %
EPITHELIAL CELLS, UA: 1 /HPF (ref 0–5)
GFR AFRICAN AMERICAN: >60
GFR NON-AFRICAN AMERICAN: >60
GLOBULIN: 4.8 G/DL
GLUCOSE BLD-MCNC: 148 MG/DL (ref 70–99)
GLUCOSE URINE: NEGATIVE MG/DL
HCT VFR BLD CALC: 41.3 % (ref 40.5–52.5)
HEMOGLOBIN: 13.2 G/DL (ref 13.5–17.5)
HYALINE CASTS: 9 /LPF (ref 0–8)
INR BLD: 1.63 (ref 0.86–1.14)
KETONES, URINE: NEGATIVE MG/DL
LACTIC ACID, SEPSIS: 1.2 MMOL/L (ref 0.4–1.9)
LACTIC ACID, SEPSIS: 2.2 MMOL/L (ref 0.4–1.9)
LEUKOCYTE ESTERASE, URINE: ABNORMAL
LYMPHOCYTES ABSOLUTE: 0.7 K/UL (ref 1–5.1)
LYMPHOCYTES RELATIVE PERCENT: 5.1 %
MCH RBC QN AUTO: 27.5 PG (ref 26–34)
MCHC RBC AUTO-ENTMCNC: 32 G/DL (ref 31–36)
MCV RBC AUTO: 86.1 FL (ref 80–100)
MICROSCOPIC EXAMINATION: YES
MONOCYTES ABSOLUTE: 0.6 K/UL (ref 0–1.3)
MONOCYTES RELATIVE PERCENT: 4.7 %
NEUTROPHILS ABSOLUTE: 11.5 K/UL (ref 1.7–7.7)
NEUTROPHILS RELATIVE PERCENT: 88.1 %
NITRITE, URINE: POSITIVE
OCCULT BLOOD DIAGNOSTIC: NORMAL
PDW BLD-RTO: 19.3 % (ref 12.4–15.4)
PH UA: 7.5 (ref 5–8)
PLATELET # BLD: 301 K/UL (ref 135–450)
PMV BLD AUTO: 10.4 FL (ref 5–10.5)
POTASSIUM SERPL-SCNC: 3.7 MMOL/L (ref 3.5–5.1)
PROTEIN UA: ABNORMAL MG/DL
PROTHROMBIN TIME: 18.6 SEC (ref 9.8–13)
RBC # BLD: 4.8 M/UL (ref 4.2–5.9)
RBC UA: ABNORMAL /HPF (ref 0–2)
SODIUM BLD-SCNC: 135 MMOL/L (ref 136–145)
SPECIFIC GRAVITY UA: 1.01 (ref 1–1.03)
TOTAL PROTEIN: 8.4 G/DL (ref 6.4–8.2)
URINE REFLEX TO CULTURE: YES
URINE TYPE: ABNORMAL
UROBILINOGEN, URINE: 0.2 E.U./DL
WBC # BLD: 13 K/UL (ref 4–11)
WBC UA: 318 /HPF (ref 0–5)
YEAST: PRESENT /HPF

## 2019-06-09 PROCEDURE — 93005 ELECTROCARDIOGRAM TRACING: CPT | Performed by: EMERGENCY MEDICINE

## 2019-06-09 PROCEDURE — 86901 BLOOD TYPING SEROLOGIC RH(D): CPT

## 2019-06-09 PROCEDURE — 6370000000 HC RX 637 (ALT 250 FOR IP): Performed by: INTERNAL MEDICINE

## 2019-06-09 PROCEDURE — 6360000002 HC RX W HCPCS: Performed by: EMERGENCY MEDICINE

## 2019-06-09 PROCEDURE — 94761 N-INVAS EAR/PLS OXIMETRY MLT: CPT

## 2019-06-09 PROCEDURE — 80053 COMPREHEN METABOLIC PANEL: CPT

## 2019-06-09 PROCEDURE — 86850 RBC ANTIBODY SCREEN: CPT

## 2019-06-09 PROCEDURE — 71045 X-RAY EXAM CHEST 1 VIEW: CPT

## 2019-06-09 PROCEDURE — 96375 TX/PRO/DX INJ NEW DRUG ADDON: CPT

## 2019-06-09 PROCEDURE — C9113 INJ PANTOPRAZOLE SODIUM, VIA: HCPCS | Performed by: EMERGENCY MEDICINE

## 2019-06-09 PROCEDURE — 85610 PROTHROMBIN TIME: CPT

## 2019-06-09 PROCEDURE — 85025 COMPLETE CBC W/AUTO DIFF WBC: CPT

## 2019-06-09 PROCEDURE — 2700000000 HC OXYGEN THERAPY PER DAY

## 2019-06-09 PROCEDURE — 1200000000 HC SEMI PRIVATE

## 2019-06-09 PROCEDURE — 99285 EMERGENCY DEPT VISIT HI MDM: CPT

## 2019-06-09 PROCEDURE — 86900 BLOOD TYPING SEROLOGIC ABO: CPT

## 2019-06-09 PROCEDURE — 94640 AIRWAY INHALATION TREATMENT: CPT

## 2019-06-09 PROCEDURE — 36415 COLL VENOUS BLD VENIPUNCTURE: CPT

## 2019-06-09 PROCEDURE — 93010 ELECTROCARDIOGRAM REPORT: CPT | Performed by: INTERNAL MEDICINE

## 2019-06-09 PROCEDURE — 96365 THER/PROPH/DIAG IV INF INIT: CPT

## 2019-06-09 PROCEDURE — 96368 THER/DIAG CONCURRENT INF: CPT

## 2019-06-09 PROCEDURE — 2580000003 HC RX 258: Performed by: EMERGENCY MEDICINE

## 2019-06-09 PROCEDURE — 87086 URINE CULTURE/COLONY COUNT: CPT

## 2019-06-09 PROCEDURE — 83605 ASSAY OF LACTIC ACID: CPT

## 2019-06-09 PROCEDURE — G0328 FECAL BLOOD SCRN IMMUNOASSAY: HCPCS

## 2019-06-09 PROCEDURE — 2580000003 HC RX 258: Performed by: INTERNAL MEDICINE

## 2019-06-09 PROCEDURE — 6360000002 HC RX W HCPCS: Performed by: INTERNAL MEDICINE

## 2019-06-09 PROCEDURE — 81001 URINALYSIS AUTO W/SCOPE: CPT

## 2019-06-09 RX ORDER — 0.9 % SODIUM CHLORIDE 0.9 %
10 VIAL (ML) INJECTION EVERY 12 HOURS
Status: DISCONTINUED | OUTPATIENT
Start: 2019-06-09 | End: 2019-06-09 | Stop reason: SDUPTHER

## 2019-06-09 RX ORDER — ATORVASTATIN CALCIUM 20 MG/1
20 TABLET, FILM COATED ORAL NIGHTLY
Status: DISCONTINUED | OUTPATIENT
Start: 2019-06-09 | End: 2019-06-12 | Stop reason: HOSPADM

## 2019-06-09 RX ORDER — ALBUTEROL SULFATE 2.5 MG/3ML
5 SOLUTION RESPIRATORY (INHALATION) ONCE
Status: COMPLETED | OUTPATIENT
Start: 2019-06-09 | End: 2019-06-09

## 2019-06-09 RX ORDER — LOSARTAN POTASSIUM 50 MG/1
50 TABLET ORAL DAILY
Status: DISCONTINUED | OUTPATIENT
Start: 2019-06-10 | End: 2019-06-12

## 2019-06-09 RX ORDER — ALBUTEROL SULFATE 90 UG/1
2 AEROSOL, METERED RESPIRATORY (INHALATION) EVERY 6 HOURS PRN
Status: DISCONTINUED | OUTPATIENT
Start: 2019-06-09 | End: 2019-06-12 | Stop reason: HOSPADM

## 2019-06-09 RX ORDER — SODIUM CHLORIDE 0.9 % (FLUSH) 0.9 %
10 SYRINGE (ML) INJECTION EVERY 12 HOURS SCHEDULED
Status: DISCONTINUED | OUTPATIENT
Start: 2019-06-09 | End: 2019-06-12 | Stop reason: HOSPADM

## 2019-06-09 RX ORDER — ONDANSETRON 2 MG/ML
4 INJECTION INTRAMUSCULAR; INTRAVENOUS EVERY 6 HOURS PRN
Status: DISCONTINUED | OUTPATIENT
Start: 2019-06-09 | End: 2019-06-12 | Stop reason: HOSPADM

## 2019-06-09 RX ORDER — 0.9 % SODIUM CHLORIDE 0.9 %
30 INTRAVENOUS SOLUTION INTRAVENOUS ONCE
Status: COMPLETED | OUTPATIENT
Start: 2019-06-09 | End: 2019-06-09

## 2019-06-09 RX ORDER — PANTOPRAZOLE SODIUM 40 MG/10ML
40 INJECTION, POWDER, LYOPHILIZED, FOR SOLUTION INTRAVENOUS EVERY 12 HOURS
Status: DISCONTINUED | OUTPATIENT
Start: 2019-06-09 | End: 2019-06-09 | Stop reason: SDUPTHER

## 2019-06-09 RX ORDER — PROMETHAZINE HYDROCHLORIDE 25 MG/ML
25 INJECTION, SOLUTION INTRAMUSCULAR; INTRAVENOUS ONCE
Status: COMPLETED | OUTPATIENT
Start: 2019-06-09 | End: 2019-06-10

## 2019-06-09 RX ORDER — NICOTINE 21 MG/24HR
1 PATCH, TRANSDERMAL 24 HOURS TRANSDERMAL EVERY 24 HOURS
Status: DISCONTINUED | OUTPATIENT
Start: 2019-06-10 | End: 2019-06-12 | Stop reason: HOSPADM

## 2019-06-09 RX ORDER — SODIUM CHLORIDE 9 MG/ML
INJECTION, SOLUTION INTRAVENOUS CONTINUOUS
Status: DISCONTINUED | OUTPATIENT
Start: 2019-06-09 | End: 2019-06-12

## 2019-06-09 RX ORDER — METHYLPREDNISOLONE SODIUM SUCCINATE 125 MG/2ML
125 INJECTION, POWDER, LYOPHILIZED, FOR SOLUTION INTRAMUSCULAR; INTRAVENOUS ONCE
Status: COMPLETED | OUTPATIENT
Start: 2019-06-09 | End: 2019-06-09

## 2019-06-09 RX ORDER — ASPIRIN 81 MG/1
81 TABLET ORAL DAILY
Status: DISCONTINUED | OUTPATIENT
Start: 2019-06-10 | End: 2019-06-12 | Stop reason: HOSPADM

## 2019-06-09 RX ORDER — TAMSULOSIN HYDROCHLORIDE 0.4 MG/1
0.4 CAPSULE ORAL NIGHTLY
Status: DISCONTINUED | OUTPATIENT
Start: 2019-06-09 | End: 2019-06-12 | Stop reason: HOSPADM

## 2019-06-09 RX ORDER — LEVOFLOXACIN 5 MG/ML
500 INJECTION, SOLUTION INTRAVENOUS ONCE
Status: COMPLETED | OUTPATIENT
Start: 2019-06-09 | End: 2019-06-09

## 2019-06-09 RX ORDER — ALBUTEROL SULFATE 2.5 MG/3ML
2.5 SOLUTION RESPIRATORY (INHALATION) EVERY 6 HOURS PRN
Status: DISCONTINUED | OUTPATIENT
Start: 2019-06-09 | End: 2019-06-12 | Stop reason: HOSPADM

## 2019-06-09 RX ORDER — SODIUM CHLORIDE 0.9 % (FLUSH) 0.9 %
10 SYRINGE (ML) INJECTION PRN
Status: DISCONTINUED | OUTPATIENT
Start: 2019-06-09 | End: 2019-06-12 | Stop reason: HOSPADM

## 2019-06-09 RX ORDER — ACETAMINOPHEN 325 MG/1
650 TABLET ORAL EVERY 4 HOURS PRN
Status: DISCONTINUED | OUTPATIENT
Start: 2019-06-09 | End: 2019-06-12 | Stop reason: HOSPADM

## 2019-06-09 RX ADMIN — SODIUM CHLORIDE 8 MG/HR: 9 INJECTION, SOLUTION INTRAVENOUS at 17:15

## 2019-06-09 RX ADMIN — ALBUTEROL SULFATE 5 MG: 2.5 SOLUTION RESPIRATORY (INHALATION) at 14:05

## 2019-06-09 RX ADMIN — MOMETASONE FUROATE AND FORMOTEROL FUMARATE DIHYDRATE 2 PUFF: 100; 5 AEROSOL RESPIRATORY (INHALATION) at 20:54

## 2019-06-09 RX ADMIN — SODIUM CHLORIDE 2268 ML: 9 INJECTION, SOLUTION INTRAVENOUS at 15:24

## 2019-06-09 RX ADMIN — SODIUM CHLORIDE, PRESERVATIVE FREE 10 ML: 5 INJECTION INTRAVENOUS at 19:28

## 2019-06-09 RX ADMIN — ONDANSETRON 4 MG: 2 INJECTION INTRAMUSCULAR; INTRAVENOUS at 19:27

## 2019-06-09 RX ADMIN — SODIUM CHLORIDE: 9 INJECTION, SOLUTION INTRAVENOUS at 19:27

## 2019-06-09 RX ADMIN — ATORVASTATIN CALCIUM 20 MG: 20 TABLET, FILM COATED ORAL at 21:42

## 2019-06-09 RX ADMIN — LEVOFLOXACIN 500 MG: 5 INJECTION, SOLUTION INTRAVENOUS at 17:15

## 2019-06-09 RX ADMIN — TAMSULOSIN HYDROCHLORIDE 0.4 MG: 0.4 CAPSULE ORAL at 21:42

## 2019-06-09 RX ADMIN — SODIUM CHLORIDE 80 MG: 9 INJECTION, SOLUTION INTRAVENOUS at 15:41

## 2019-06-09 RX ADMIN — METHYLPREDNISOLONE SODIUM SUCCINATE 125 MG: 125 INJECTION, POWDER, FOR SOLUTION INTRAMUSCULAR; INTRAVENOUS at 14:00

## 2019-06-09 RX ADMIN — CEFTRIAXONE 1 G: 1 INJECTION, POWDER, FOR SOLUTION INTRAMUSCULAR; INTRAVENOUS at 19:27

## 2019-06-09 RX ADMIN — METOPROLOL TARTRATE 25 MG: 25 TABLET ORAL at 21:42

## 2019-06-09 ASSESSMENT — PAIN SCALES - GENERAL
PAINLEVEL_OUTOF10: 0
PAINLEVEL_OUTOF10: 0

## 2019-06-09 NOTE — ED NOTES
Fall risk screening completed. Fall risk bracelet applied to patient. Non-skid socks provided and placed on patient. The call light is within the patient's reach, and instructions for use were provided. The bed is in the lowest position with wheels locked. The patient has been advised to notify staff, using the call light, if there is a need to get up or use restroom. The patient verbalized understanding of safety precautions and how to contact staff for assistance.       Gust Ormond, RN  06/09/19 4944

## 2019-06-09 NOTE — ED NOTES
Acknowledged pt by pt's name. Verified pt by name and date of birth. Checked arm band, allergies, reviewed past medical history. Introduced myself to patient  Duration of ED plan of care explained to patient  Explained planned tests and procedures  Thanked patient for coming to [facility].    Asked if there was anything else I could do for the patient before exiting room. CB in reach.      Meenakshi Ruby RN  06/09/19 3925

## 2019-06-09 NOTE — ED PROVIDER NOTES
[facility]  eMERGENCY dEPARTMENT eNCOUnter      Pt Name: Pedro Islas  MRN: [id number]  [place] 1950  Date of evaluation: 6/9/2019  Provider: Lindy Kramerr, MD    [address] [place] [address]       Chief Complaint   Patient presents with    Diarrhea     tarry stools         CRITICAL CARE TIME   Total Critical Care time was 0 minutes, excluding separately reportable procedures. There was a high probability of clinically significant/life threatening deterioration in the patient's condition which required my urgent intervention. HISTORY OF PRESENT ILLNESS  (Location/Symptom, Timing/Onset, Context/Setting, Quality, Duration, Modifying Factors, Severity.)   Pedro Islas is a 80 y.o. male who presents to the emergency department complaining of decreased appetite, poor by mouth intake, and dark stools. He states he hasn't had an appetite for about 4 days, he is eaten very little. He's been nauseated. He states yesterday he started vomiting and had some \"black stuff\" in it. He states for at least 3 or 4 days she's had some intermittent black stools which have been loose. No blood. He is severe COPD, is on home oxygen, he states the last several days she's been more short of breath and coughing more than usual. The cough is productive white phlegm. He denies any fever. The patient lives at home, he has COPD, he is on oxygen, he is bedridden secondary to being a paraplegic. He has minimal motor function his lower extremities is not able to stand or ambulate. He denies any history of a previous GI bleed. He does take a baby aspirin daily. Nursing Notes were reviewed and I agree. REVIEW OF SYSTEMS    (2-9 systems for level 4, 10 or more for level 5)     Gen.: No fever or chills. ENT: No sore throat earache or nasal congestion. Cardiovascular: No chest pain. Pulmonary: Increased difficulty breathing, increased cough productive of white sputum.   Cardiovascular: No chest pain.  GI: No abdominal pain. Decreased appetite for 4 days. Hematemesis and melena. No gross blood in vomitus or stool. Neuro: Generalized weakness. Paraplegic. Except as noted above the remainder of the review of systems was reviewed and negative.        PAST MEDICAL HISTORY     Past Medical History:   Diagnosis Date    Aortic valve replaced     [facility] ([facility]     Hypertension     [facility]          SURGICAL HISTORY       Past Surgical History:   Procedure Laterality Date    AORTIC VALVE REPLACEMENT      SPLENECTOMY           CURRENT MEDICATIONS       Previous Medications    ALBUTEROL (PROVENTIL) (2.5 MG/3ML) 0.083% NEBULIZER SOLUTION    Take 2.5 mg by nebulization every 6 hours as needed for Wheezing    ALBUTEROL SULFATE  (90 BASE) MCG/ACT INHALER    Inhale 2 puffs into the lungs every 6 hours as needed for Wheezing    ASPIRIN EC 81 MG EC TABLET    Take 81 mg by mouth daily    ATORVASTATIN (LIPITOR) 40 MG TABLET    Take 20 mg by mouth nightly     BUDESONIDE-FORMOTEROL (SYMBICORT) 80-4.5 MCG/ACT AERO    Inhale 2 puffs into the lungs 2 times daily    GUAIFENESIN-DEXTROMETHORPHAN (ROBITUSSIN DM) 100-10 MG/5ML SYRUP    Take 5 mLs by mouth 4 times daily as needed for Cough    LOSARTAN (COZAAR) 100 MG TABLET    Take 50 mg by mouth daily     METOPROLOL TARTRATE (LOPRESSOR) 25 MG TABLET    Take 25 mg by mouth 2 times daily    NICOTINE (NICODERM CQ) 21 MG/24HR    Place 1 patch onto the skin every 24 hours    NICOTINE POLACRILEX (COMMIT) 2 MG LOZENGE    Take 2 mg by mouth as needed for Smoking cessation    OMEPRAZOLE (PRILOSEC) 20 MG DELAYED RELEASE CAPSULE    Take 20 mg by mouth daily    PREDNISONE (DELTASONE) 10 MG TABLET    Take 1-4 tablets by mouth daily Starting 10/21 - prednisone taper 40 mg x 3, 30 mg x 3, 20 mg x 3, then 10 mg x 3    ROFLUMILAST (DALIRESP) 500 MCG TABLET    Take 500 mcg by mouth daily    TAMSULOSIN (FLOMAX) 0.4 MG CAPSULE    Take 0.4 mg by mouth nightly     TIOTROPIUM (SPIRIVA) 18 MCG INHALATION CAPSULE    Inhale 18 mcg into the lungs daily       ALLERGIES     Amoxicillin-pot clavulanate; Ipratropium; Rivaroxaban; and Vancomycin    FAMILY HISTORY     History reviewed. No pertinent family history. SOCIAL HISTORY       Social History     Socioeconomic History    Marital status:      Spouse name: None    Number of children: None    Years of education: None    Highest education level: None   Occupational History    None   Social Needs    Financial resource strain: None    Food insecurity:     Worry: None     Inability: None    Transportation needs:     Medical: None     Non-medical: None   Tobacco Use    Smoking status: Never Smoker    Smokeless tobacco: Never Used   Substance and Sexual Activity    Alcohol use: No    Drug use: No    Sexual activity: None   Lifestyle    Physical activity:     Days per week: None     Minutes per session: None    Stress: None   Relationships    Social connections:     Talks on phone: None     Gets together: None     Attends Evangelical service: None     Active member of club or organization: None     Attends meetings of clubs or organizations: None     Relationship status: None    Intimate partner violence:     Fear of current or ex partner: None     Emotionally abused: None     Physically abused: None     Forced sexual activity: None   Other Topics Concern    None   Social History Narrative    None         PHYSICAL EXAM    (up to 7 for level 4, 8 or more for level 5)     ED Triage Vitals [06/09/19 1246]   BP Temp Temp Source Pulse Resp SpO2 Height Weight   107/73 97.5 °F (36.4 °C) Oral 67 16 96 % 5' 8\" (1.727 m) 166 lb 10.7 oz (75.6 kg)       Gen. : An alert oriented white male who appears mildly dyspneic. Head: Atraumatic and normocephalic. Eyes: No conjunctival injection. No pallor. ENT: [place] is clear. TMs are intact and clear. Oropharynx is decreased moisture without erythema.   Neck: Supple without adenopathy or thyromegaly. Heart: Regular rate and rhythm. No murmurs gallops noted. Lungs: Breath sounds decreased bilaterally with mild expiratory wheezing, prolonged expiratory phase. No rales or rhonchi. Abdomen: Obese, soft, nontender. No masses organomegaly. Rectal exam: No masses, nontender. Black stool. No gross blood. Hemoccult sent for testing. Musculoskeletal: No lower extremity edema. Muscle atrophy in the lower extremities. He is able to dorsiflex and plantarflex his feet. He is not able to lift his legs off the bed. Skin: Pallor, no cyanosis or diaphoresis. Neuro: Awake, alert, oriented ×3. Symmetrical reactive pupils. Intact extraocular movements. No facial asymmetry. Midline tongue. Symmetrical motor function the upper extremities. Paresis of the lower extremities as above. DIFFERENTIAL DIAGNOSIS   Differential includes but is not limited to upper GI bleed secondary to peptic ulcer disease, upper GI bleed secondary to gastritis, upper GI bleed secondary to AV malformation, COPD exacerbation, pneumonia, dehydration, sepsis. DIAGNOSTIC RESULTS     EKG: All EKG's are interpreted by Kirk Hernandez MD in the absence of a cardiologist.    Sinus rhythm, rate of 61, age indeterminate inferior infarct. No acute ST-T wave changes. Rhythm ship shows a sinus rhythm with a rate of 61, CT interval 196 ms, QRS of under 2 ms with no other ectopy is interpreted by me. This is compared to an EKG dated 10/11/18, the previously noted sinus tachycardia has resolved    RADIOLOGY:   Non-plain film images such as CT, Ultrasound and MRI are read by the radiologist. Plain radiographic images are visualized and preliminarily interpreted Kirk Hernandez MD with the below findings:      Interpretation per the Radiologist below, if available at the time of this note:    XR CHEST PORTABLE   Preliminary Result   1. No acute cardiopulmonary disease. 2. COPD.                ED BEDSIDE ULTRASOUND:   Performed by ED Physician - none    LABS:  Labs Reviewed   CBC WITH AUTO DIFFERENTIAL - Abnormal; Notable for the following components:       Result Value    WBC 13.0 (*)     Hemoglobin 13.2 (*)     RDW 19.3 (*)     Neutrophils # 11.5 (*)     Lymphocytes # 0.7 (*)     All other components within normal limits    Narrative:     Performed at:  [address] [place]   Phone [phone]   COMPREHENSIVE METABOLIC PANEL - Abnormal; Notable for the following components:    Sodium 135 (*)     Chloride 91 (*)     Glucose 148 (*)     Calcium 10.7 (*)     Total Protein 8.4 (*)     Albumin/Globulin Ratio 0.8 (*)     AST 43 (*)     All other components within normal limits    Narrative:     Performed at:  [address] [place]   Phone [phone]   PROTIME-INR - Abnormal; Notable for the following components:    Protime 18.6 (*)     INR 1.63 (*)     All other components within normal limits    Narrative:     Performed at:  [address] [address]   Phone [phone]   URINE RT REFLEX TO CULTURE - Abnormal; Notable for the following components:    Clarity, UA TURBID (*)     Bilirubin Urine SMALL (*)     Blood, Urine MODERATE (*)     Protein, UA TRACE (*)     Nitrite, Urine POSITIVE (*)     Leukocyte Esterase, Urine LARGE (*)     All other components within normal limits    Narrative:     Performed at:  [address] [place] [address]   Phone [phone]   MICROSCOPIC URINALYSIS - Abnormal; Notable for the following components:    RBC, UA 20-50 (*)     Bacteria, UA 4+ (*)     Yeast, UA Present (*)     Hyaline Casts, UA 9 (*)     WBC,  (*)     All other components within normal limits    Narrative:     Performed at:  [facility]  [address] [place] [address]   Phone [phone]   URINE CULTURE   BLOOD OCCULT STOOL DIAGNOSTIC    Narrative:     ORDER#: [id number]                          ORDERED BY: Zheng Atkinson  SOURCE: Stool                              COLLECTED:  06/09/19 13:45  ANTIBIOTICS AT JORDAN.:                      RECEIVED :  06/09/19 13:57  Performed at:  [facility]  [address] [place] [address]   Phone [phone]   LACTATE, SEPSIS    Narrative:     Performed at:  [facility]  [address] [place] [address]   Phone [phone]   LACTATE, SEPSIS   TYPE AND SCREEN    Narrative:     Performed at:  [facility]  [address] [place] [address]   Phone [phone]       All other labs were within normal range or not returned as of this dictation. EMERGENCY DEPARTMENT COURSE and DIFFERENTIAL DIAGNOSIS/MDM:   Vitals:    Vitals:    06/09/19 1402 06/09/19 1405 06/09/19 1410 06/09/19 1416   BP: 105/83   (!) 122/95   Pulse: 65   70   Resp: 19 20 14 17   Temp:       TempSrc:       SpO2: 93% 90% 99%    Weight:       Height:           The patient presented with increased cough and difficulty breathing, he has baseline COPD and is on home oxygen. He reports sports vomiting black material and having black stools intermittently for several days. He reports no gross blood. He has a chronic indwelling Gtz catheter. His examination is consistent with COPD exacerbation, he has significant bronchospasm and prolonged expiratory phase. He has no evidence of infiltrate/failure on chest x-ray. No pleural effusion. He does have a urinary tract infection. He has a large amount of black stool on rectal exam, but it was Hemoccult negative. I covered him for an upper GI bleed with IV Protonix, bolus and drip. He's been covered for his urinary tract infection with Rocephin.  He was given hand-held nebulizers and IV Solu-Medrol for COPD exacerbation. He is breathing more comfortably. His pulmonary exam was not significantly changed, he feels somewhat better. I discussed the test results with the patient his family. I discussed the need for hospitalization for further evaluation of his hematemesis, and continue treatment of his COPD exacerbation urinary tract infection. He does have BA benefits, but states that he has Medicare as well he wants to stay here. He does not want me to call the [facility]. The hospitalist will be consult for admission. CONSULTS:  None    PROCEDURES:  None    FINAL IMPRESSION      1. COPD exacerbation ([facility]    2. Urinary tract infection with hematuria, site unspecified    3. Hematemesis with nausea          DISPOSITION/PLAN   DISPOSITION Decision To Admit 06/09/2019 04:17:57 PM      PATIENT REFERRED TO:  No follow-up provider specified.     DISCHARGE MEDICATIONS:  New Prescriptions    No medications on file       (Please note that portions of this note were completed with a voice recognition program.  Efforts were made to edit the dictations but occasionally words are mis-transcribed.)    Jose Elias Poe MD  Attending Emergency Physician        Oseas Morrison MD  06/09/19 4103

## 2019-06-09 NOTE — ED NOTES
Pt presents with c/o nausea, vomiting and dark stools. Upon arrival pt with large paste like black stool.      Gaetano Limon RN  06/09/19 1467

## 2019-06-09 NOTE — ED NOTES
ED SBAR report provider to [facility]. Patient to be transported to Room 3103 via stretcher by transport tech.with IV medications infusing. IV site clean, dry, and intact. MEWS score and pain assessed and documented. Updated patient on plan of care.      Lenard Mg RN  06/09/19 3400

## 2019-06-10 ENCOUNTER — ANESTHESIA EVENT (OUTPATIENT)
Dept: ENDOSCOPY | Age: 69
DRG: 699 | End: 2019-06-10
Payer: OTHER GOVERNMENT

## 2019-06-10 ENCOUNTER — ANESTHESIA (OUTPATIENT)
Dept: ENDOSCOPY | Age: 69
DRG: 699 | End: 2019-06-10
Payer: OTHER GOVERNMENT

## 2019-06-10 VITALS
SYSTOLIC BLOOD PRESSURE: 74 MMHG | RESPIRATION RATE: 21 BRPM | DIASTOLIC BLOOD PRESSURE: 37 MMHG | OXYGEN SATURATION: 97 % | TEMPERATURE: 98.6 F

## 2019-06-10 LAB
ANION GAP SERPL CALCULATED.3IONS-SCNC: 15 MMOL/L (ref 3–16)
BASOPHILS ABSOLUTE: 0.1 K/UL (ref 0–0.2)
BASOPHILS RELATIVE PERCENT: 0.7 %
BUN BLDV-MCNC: 17 MG/DL (ref 7–20)
CALCIUM SERPL-MCNC: 9.5 MG/DL (ref 8.3–10.6)
CHLORIDE BLD-SCNC: 100 MMOL/L (ref 99–110)
CO2: 27 MMOL/L (ref 21–32)
CREAT SERPL-MCNC: 0.8 MG/DL (ref 0.8–1.3)
EOSINOPHILS ABSOLUTE: 0 K/UL (ref 0–0.6)
EOSINOPHILS RELATIVE PERCENT: 0 %
FERRITIN: 110.4 NG/ML (ref 30–400)
GFR AFRICAN AMERICAN: >60
GFR NON-AFRICAN AMERICAN: >60
GLUCOSE BLD-MCNC: 99 MG/DL (ref 70–99)
HCT VFR BLD CALC: 35.4 % (ref 40.5–52.5)
HCT VFR BLD CALC: 36.1 % (ref 40.5–52.5)
HEMOGLOBIN: 11.4 G/DL (ref 13.5–17.5)
HEMOGLOBIN: 11.7 G/DL (ref 13.5–17.5)
IRON SATURATION: 30 % (ref 20–50)
IRON: 51 UG/DL (ref 59–158)
LYMPHOCYTES ABSOLUTE: 0.8 K/UL (ref 1–5.1)
LYMPHOCYTES RELATIVE PERCENT: 12.2 %
MCH RBC QN AUTO: 27.5 PG (ref 26–34)
MCHC RBC AUTO-ENTMCNC: 32.3 G/DL (ref 31–36)
MCV RBC AUTO: 85.3 FL (ref 80–100)
MONOCYTES ABSOLUTE: 0.2 K/UL (ref 0–1.3)
MONOCYTES RELATIVE PERCENT: 3.3 %
NEUTROPHILS ABSOLUTE: 5.7 K/UL (ref 1.7–7.7)
NEUTROPHILS RELATIVE PERCENT: 83.8 %
PDW BLD-RTO: 19.2 % (ref 12.4–15.4)
PLATELET # BLD: 279 K/UL (ref 135–450)
PMV BLD AUTO: 10.6 FL (ref 5–10.5)
POTASSIUM REFLEX MAGNESIUM: 4 MMOL/L (ref 3.5–5.1)
RBC # BLD: 4.15 M/UL (ref 4.2–5.9)
SODIUM BLD-SCNC: 142 MMOL/L (ref 136–145)
TOTAL IRON BINDING CAPACITY: 172 UG/DL (ref 260–445)
TSH REFLEX: 0.61 UIU/ML (ref 0.27–4.2)
WBC # BLD: 6.8 K/UL (ref 4–11)

## 2019-06-10 PROCEDURE — 2500000003 HC RX 250 WO HCPCS: Performed by: NURSE ANESTHETIST, CERTIFIED REGISTERED

## 2019-06-10 PROCEDURE — 7100000001 HC PACU RECOVERY - ADDTL 15 MIN: Performed by: INTERNAL MEDICINE

## 2019-06-10 PROCEDURE — 1200000000 HC SEMI PRIVATE

## 2019-06-10 PROCEDURE — 2580000003 HC RX 258: Performed by: INTERNAL MEDICINE

## 2019-06-10 PROCEDURE — 83550 IRON BINDING TEST: CPT

## 2019-06-10 PROCEDURE — 83540 ASSAY OF IRON: CPT

## 2019-06-10 PROCEDURE — 6360000002 HC RX W HCPCS: Performed by: NURSE ANESTHETIST, CERTIFIED REGISTERED

## 2019-06-10 PROCEDURE — 2709999900 HC NON-CHARGEABLE SUPPLY: Performed by: INTERNAL MEDICINE

## 2019-06-10 PROCEDURE — 3700000000 HC ANESTHESIA ATTENDED CARE: Performed by: INTERNAL MEDICINE

## 2019-06-10 PROCEDURE — 94150 VITAL CAPACITY TEST: CPT

## 2019-06-10 PROCEDURE — 36415 COLL VENOUS BLD VENIPUNCTURE: CPT

## 2019-06-10 PROCEDURE — 3700000001 HC ADD 15 MINUTES (ANESTHESIA): Performed by: INTERNAL MEDICINE

## 2019-06-10 PROCEDURE — 85018 HEMOGLOBIN: CPT

## 2019-06-10 PROCEDURE — 6370000000 HC RX 637 (ALT 250 FOR IP): Performed by: INTERNAL MEDICINE

## 2019-06-10 PROCEDURE — 84443 ASSAY THYROID STIM HORMONE: CPT

## 2019-06-10 PROCEDURE — C9113 INJ PANTOPRAZOLE SODIUM, VIA: HCPCS | Performed by: INTERNAL MEDICINE

## 2019-06-10 PROCEDURE — 94760 N-INVAS EAR/PLS OXIMETRY 1: CPT

## 2019-06-10 PROCEDURE — 7100000000 HC PACU RECOVERY - FIRST 15 MIN: Performed by: INTERNAL MEDICINE

## 2019-06-10 PROCEDURE — 82728 ASSAY OF FERRITIN: CPT

## 2019-06-10 PROCEDURE — 85014 HEMATOCRIT: CPT

## 2019-06-10 PROCEDURE — 6360000002 HC RX W HCPCS: Performed by: NURSE PRACTITIONER

## 2019-06-10 PROCEDURE — 0D568ZZ DESTRUCTION OF STOMACH, VIA NATURAL OR ARTIFICIAL OPENING ENDOSCOPIC: ICD-10-PCS | Performed by: INTERNAL MEDICINE

## 2019-06-10 PROCEDURE — 6360000002 HC RX W HCPCS: Performed by: INTERNAL MEDICINE

## 2019-06-10 PROCEDURE — 94640 AIRWAY INHALATION TREATMENT: CPT

## 2019-06-10 PROCEDURE — 3609013200 HC EGD W/ ABLATION: Performed by: INTERNAL MEDICINE

## 2019-06-10 PROCEDURE — 85025 COMPLETE CBC W/AUTO DIFF WBC: CPT

## 2019-06-10 PROCEDURE — 2720000010 HC SURG SUPPLY STERILE: Performed by: INTERNAL MEDICINE

## 2019-06-10 PROCEDURE — 2700000000 HC OXYGEN THERAPY PER DAY

## 2019-06-10 PROCEDURE — 80048 BASIC METABOLIC PNL TOTAL CA: CPT

## 2019-06-10 RX ORDER — DOXYCYCLINE HYCLATE 100 MG
100 TABLET ORAL 2 TIMES DAILY
COMMUNITY
Start: 2019-06-05

## 2019-06-10 RX ORDER — POTASSIUM CHLORIDE 20 MEQ/1
20 TABLET, EXTENDED RELEASE ORAL DAILY
COMMUNITY

## 2019-06-10 RX ORDER — LABETALOL HYDROCHLORIDE 5 MG/ML
5 INJECTION, SOLUTION INTRAVENOUS EVERY 10 MIN PRN
Status: DISCONTINUED | OUTPATIENT
Start: 2019-06-10 | End: 2019-06-10

## 2019-06-10 RX ORDER — KETOCONAZOLE 20 MG/G
CREAM TOPICAL 2 TIMES DAILY
Status: ON HOLD | COMMUNITY
End: 2019-06-12 | Stop reason: HOSPADM

## 2019-06-10 RX ORDER — POTASSIUM CHLORIDE 750 MG/1
10 CAPSULE, EXTENDED RELEASE ORAL 2 TIMES DAILY
Status: ON HOLD | COMMUNITY
End: 2019-06-12 | Stop reason: HOSPADM

## 2019-06-10 RX ORDER — HYDRALAZINE HYDROCHLORIDE 20 MG/ML
5 INJECTION INTRAMUSCULAR; INTRAVENOUS
Status: DISCONTINUED | OUTPATIENT
Start: 2019-06-10 | End: 2019-06-10

## 2019-06-10 RX ORDER — ONDANSETRON 2 MG/ML
4 INJECTION INTRAMUSCULAR; INTRAVENOUS
Status: DISCONTINUED | OUTPATIENT
Start: 2019-06-10 | End: 2019-06-10

## 2019-06-10 RX ORDER — MEPERIDINE HYDROCHLORIDE 25 MG/ML
12.5 INJECTION INTRAMUSCULAR; INTRAVENOUS; SUBCUTANEOUS EVERY 5 MIN PRN
Status: DISCONTINUED | OUTPATIENT
Start: 2019-06-10 | End: 2019-06-10

## 2019-06-10 RX ORDER — CALCIUM CARBONATE 200(500)MG
1 TABLET,CHEWABLE ORAL 3 TIMES DAILY
COMMUNITY

## 2019-06-10 RX ORDER — BACLOFEN 10 MG/1
10 TABLET ORAL 3 TIMES DAILY
COMMUNITY

## 2019-06-10 RX ORDER — PROPOFOL 10 MG/ML
INJECTION, EMULSION INTRAVENOUS PRN
Status: DISCONTINUED | OUTPATIENT
Start: 2019-06-10 | End: 2019-06-10 | Stop reason: SDUPTHER

## 2019-06-10 RX ORDER — OXYCODONE HYDROCHLORIDE AND ACETAMINOPHEN 5; 325 MG/1; MG/1
1 TABLET ORAL PRN
Status: DISCONTINUED | OUTPATIENT
Start: 2019-06-10 | End: 2019-06-10

## 2019-06-10 RX ORDER — TORSEMIDE 20 MG/1
40 TABLET ORAL DAILY
Status: ON HOLD | COMMUNITY
End: 2019-06-12 | Stop reason: SDUPTHER

## 2019-06-10 RX ORDER — LIDOCAINE HYDROCHLORIDE 20 MG/ML
INJECTION, SOLUTION EPIDURAL; INFILTRATION; INTRACAUDAL; PERINEURAL PRN
Status: DISCONTINUED | OUTPATIENT
Start: 2019-06-10 | End: 2019-06-10 | Stop reason: SDUPTHER

## 2019-06-10 RX ORDER — OXYCODONE HYDROCHLORIDE AND ACETAMINOPHEN 5; 325 MG/1; MG/1
2 TABLET ORAL PRN
Status: DISCONTINUED | OUTPATIENT
Start: 2019-06-10 | End: 2019-06-10

## 2019-06-10 RX ORDER — MORPHINE SULFATE 2 MG/ML
1 INJECTION, SOLUTION INTRAMUSCULAR; INTRAVENOUS EVERY 5 MIN PRN
Status: DISCONTINUED | OUTPATIENT
Start: 2019-06-10 | End: 2019-06-10

## 2019-06-10 RX ORDER — MORPHINE SULFATE 2 MG/ML
2 INJECTION, SOLUTION INTRAMUSCULAR; INTRAVENOUS EVERY 5 MIN PRN
Status: DISCONTINUED | OUTPATIENT
Start: 2019-06-10 | End: 2019-06-10

## 2019-06-10 RX ORDER — DIAPER,BRIEF,INFANT-TODD,DISP
EACH MISCELLANEOUS 2 TIMES DAILY PRN
Status: ON HOLD | COMMUNITY
End: 2019-06-12 | Stop reason: HOSPADM

## 2019-06-10 RX ORDER — GABAPENTIN 600 MG/1
600 TABLET ORAL 3 TIMES DAILY
COMMUNITY

## 2019-06-10 RX ORDER — LANOLIN ALCOHOL/MO/W.PET/CERES
6 CREAM (GRAM) TOPICAL NIGHTLY PRN
COMMUNITY

## 2019-06-10 RX ORDER — SULFAMETHOXAZOLE AND TRIMETHOPRIM 800; 160 MG/1; MG/1
1 TABLET ORAL 2 TIMES DAILY
COMMUNITY

## 2019-06-10 RX ORDER — TAMSULOSIN HYDROCHLORIDE 0.4 MG/1
0.4 CAPSULE ORAL DAILY
Status: ON HOLD | COMMUNITY
End: 2019-06-12 | Stop reason: HOSPADM

## 2019-06-10 RX ORDER — LANOLIN ALCOHOL/MO/W.PET/CERES
325 CREAM (GRAM) TOPICAL 2 TIMES DAILY
COMMUNITY

## 2019-06-10 RX ORDER — PHENYLEPHRINE HCL IN 0.9% NACL 1 MG/10 ML
SYRINGE (ML) INTRAVENOUS PRN
Status: DISCONTINUED | OUTPATIENT
Start: 2019-06-10 | End: 2019-06-10 | Stop reason: SDUPTHER

## 2019-06-10 RX ADMIN — Medication 200 MCG: at 14:25

## 2019-06-10 RX ADMIN — ATORVASTATIN CALCIUM 20 MG: 20 TABLET, FILM COATED ORAL at 20:02

## 2019-06-10 RX ADMIN — MOMETASONE FUROATE AND FORMOTEROL FUMARATE DIHYDRATE 2 PUFF: 100; 5 AEROSOL RESPIRATORY (INHALATION) at 07:45

## 2019-06-10 RX ADMIN — METOPROLOL TARTRATE 25 MG: 25 TABLET ORAL at 09:05

## 2019-06-10 RX ADMIN — LOSARTAN POTASSIUM 50 MG: 50 TABLET ORAL at 09:05

## 2019-06-10 RX ADMIN — MOMETASONE FUROATE AND FORMOTEROL FUMARATE DIHYDRATE 2 PUFF: 100; 5 AEROSOL RESPIRATORY (INHALATION) at 19:37

## 2019-06-10 RX ADMIN — TAMSULOSIN HYDROCHLORIDE 0.4 MG: 0.4 CAPSULE ORAL at 20:02

## 2019-06-10 RX ADMIN — CEFTRIAXONE 1 G: 1 INJECTION, POWDER, FOR SOLUTION INTRAMUSCULAR; INTRAVENOUS at 20:00

## 2019-06-10 RX ADMIN — METOPROLOL TARTRATE 25 MG: 25 TABLET ORAL at 20:02

## 2019-06-10 RX ADMIN — PROMETHAZINE HYDROCHLORIDE 25 MG: 25 INJECTION INTRAMUSCULAR; INTRAVENOUS at 00:20

## 2019-06-10 RX ADMIN — SODIUM CHLORIDE 8 MG/HR: 9 INJECTION, SOLUTION INTRAVENOUS at 03:34

## 2019-06-10 RX ADMIN — TIOTROPIUM BROMIDE 18 MCG: 18 CAPSULE ORAL; RESPIRATORY (INHALATION) at 07:45

## 2019-06-10 RX ADMIN — PROPOFOL 50 MG: 10 INJECTION, EMULSION INTRAVENOUS at 14:28

## 2019-06-10 RX ADMIN — ASPIRIN 81 MG: 81 TABLET, COATED ORAL at 09:05

## 2019-06-10 RX ADMIN — SODIUM CHLORIDE 8 MG/HR: 9 INJECTION, SOLUTION INTRAVENOUS at 13:07

## 2019-06-10 RX ADMIN — LIDOCAINE HYDROCHLORIDE 50 MG: 20 INJECTION, SOLUTION EPIDURAL; INFILTRATION; INTRACAUDAL; PERINEURAL at 14:22

## 2019-06-10 RX ADMIN — SODIUM CHLORIDE: 9 INJECTION, SOLUTION INTRAVENOUS at 09:27

## 2019-06-10 RX ADMIN — PROPOFOL 50 MG: 10 INJECTION, EMULSION INTRAVENOUS at 14:33

## 2019-06-10 RX ADMIN — PROPOFOL 100 MG: 10 INJECTION, EMULSION INTRAVENOUS at 14:24

## 2019-06-10 ASSESSMENT — PULMONARY FUNCTION TESTS
PIF_VALUE: 0

## 2019-06-10 ASSESSMENT — PAIN - FUNCTIONAL ASSESSMENT: PAIN_FUNCTIONAL_ASSESSMENT: 0-10

## 2019-06-10 ASSESSMENT — PAIN SCALES - GENERAL
PAINLEVEL_OUTOF10: 0
PAINLEVEL_OUTOF10: 0

## 2019-06-10 NOTE — H&P
Pre-operative History and Physical    Patient: Surya Snow  : 1950  Acct#:     Intended Procedure:  EGD    HISTORY OF PRESENT ILLNESS:  The patient is a 80 y.o. male  who presents for EGD due to early satiety, weakness, anemia and possible melena. Past Medical History:        Diagnosis Date    Aortic valve replaced     [facility] obstructive [facility]) ([facility]     Hypertension     Paraplegia ([facility]      Past Surgical History:        Procedure Laterality Date    AORTIC VALVE REPLACEMENT      SPLENECTOMY       Medications Prior to Admission:   No current facility-administered medications on file prior to encounter. Current Outpatient Medications on File Prior to Encounter   Medication Sig Dispense Refill    vitamin D (CHOLECALCIFEROL) 1000 units TABS tablet Take 1,000 Units by mouth daily      doxycycline hyclate (VIBRA-TABS) 100 MG tablet Take 100 mg by mouth 2 times daily      baclofen (LIORESAL) 10 MG tablet Take 10 mg by mouth 3 times daily 10mg qam and midday  And 20mg hs      ferrous sulfate 325 (65 Fe) MG EC tablet Take 325 mg by mouth 2 times daily      gabapentin (NEURONTIN) 600 MG tablet Take 600 mg by mouth 3 times daily.       potassium chloride (KLOR-CON M) 20 MEQ extended release tablet Take 20 mEq by mouth daily      torsemide (DEMADEX) 20 MG tablet Take 40 mg by mouth daily      melatonin 3 MG TABS tablet Take 6 mg by mouth nightly as needed      apixaban (ELIQUIS) 5 MG TABS tablet Take 5 mg by mouth 2 times daily      albuterol (PROVENTIL) (2.5 MG/3ML) 0.083% nebulizer solution Take 2.5 mg by nebulization every 6 hours as needed for Wheezing      albuterol sulfate  (90 Base) MCG/ACT inhaler Inhale 2 puffs into the lungs every 6 hours as needed for Wheezing      aspirin EC 81 MG EC tablet Take 81 mg by mouth daily      budesonide-formoterol (SYMBICORT) 80-4.5 MCG/ACT AERO Inhale 2 puffs into the lungs 2 times daily      metoprolol tartrate (LOPRESSOR) 25 MG tablet Take 25 mg by mouth 2 times daily      omeprazole (PRILOSEC) 20 MG delayed release capsule Take 20 mg by mouth daily      Roflumilast (DALIRESP) 500 MCG tablet Take 500 mcg by mouth daily      tamsulosin (FLOMAX) 0.4 MG capsule Take 0.4 mg by mouth nightly       tiotropium (SPIRIVA) 18 MCG inhalation capsule Inhale 18 mcg into the lungs daily      guaiFENesin-dextromethorphan (ROBITUSSIN DM) 100-10 MG/5ML syrup Take 5 mLs by mouth 4 times daily as needed for Cough          Allergies:  Amoxicillin-pot clavulanate; Ipratropium; Rivaroxaban; and Vancomycin    Social History:   TOBACCO:   reports that he has never smoked. He has never used smokeless tobacco.  ETOH:   reports that he does not drink alcohol. DRUGS:   reports that he does not use drugs. PHYSICAL EXAM:      Vital Signs: /60   Pulse 67   Temp 98.2 °F (36.8 °C) (Temporal)   Resp 18   Ht 5' 8\" (1.727 m)   Wt 163 lb 2.3 oz (74 kg)   SpO2 92%   BMI 24.81 kg/m²    Airway: No stridor or wheezing noted. Good air movement  Pulmonary: without wheezes. Clear to auscultation  Cardiac:regular rate and rhythm without loud murmurs  Abdomen:soft, nontender,  Bowel sounds present    Pre-Procedure Assessment / Plan:  1) EGD    ASA Grade:  ASA 3 - Patient with moderate systemic disease with functional limitations  Mallampati Classification:  Class III    Level of Sedation Plan:MAC    Post Procedure plan: Return to same level of care    I assessed the patient and find that the patient is in satisfactory condition to proceed with the planned procedure and sedation plan. I have explained the risk, benefits, and alternatives to the procedure; the patient understands and agrees to proceed.        Ruben Jackson MD  6/10/2019

## 2019-06-10 NOTE — PROGRESS NOTES
CAll placed to endo to ask for diet order from Dr. Ruben Pyle and for Dr. Ruben Pyle to talk to pt's wife later. Darin Morales RN answered states will talk to Dr. Ruben Pyle.

## 2019-06-10 NOTE — PROGRESS NOTES
Alert and oriented. Agreeable to procedure.   Electronically signed by Rosa Conti RN on 6/10/2019 at 12:42 PM

## 2019-06-10 NOTE — CONSULTS
Tobacco Use    Smoking status: Never Smoker    Smokeless tobacco: Never Used   Substance and Sexual Activity    Alcohol use: No    Drug use: No    Sexual activity: None   Lifestyle    Physical activity:     Days per week: None     Minutes per session: None    Stress: None   Relationships    Social connections:     Talks on phone: None     Gets together: None     Attends [employer] service: None     Active member of club or organization: None     Attends meetings of clubs or organizations: None     Relationship status: None    Intimate partner violence:     Fear of current or ex partner: None     Emotionally abused: None     Physically abused: None     Forced sexual activity: None   Other Topics Concern    None   Social History Narrative    None       MEDICATIONS   SCHEDULED:    cefTRIAXone (ROCEPHIN) IV 1 g Q24H   aspirin EC 81 mg Daily   atorvastatin 20 mg Nightly   mometasone-formoterol 2 puff BID   losartan 50 mg Daily   metoprolol tartrate 25 mg BID   nicotine 1 patch Q24H   tamsulosin 0.4 mg Nightly   tiotropium 18 mcg Daily   sodium chloride flush 10 mL 2 times per day     FLUIDS/DRIPS:     pantoprozole (PROTONIX) infusion 8 mg/hr (06/10/19 0334)    sodium chloride 75 mL/hr at 06/09/19 1927     PRNs:   albuterol sulfate HFA 2 puff Q6H PRN   nicotine polacrilex 2 mg PRN   albuterol 2.5 mg Q6H PRN   sodium chloride flush 10 mL PRN   acetaminophen 650 mg Q4H PRN   ondansetron 4 mg Q6H PRN     ALLERGIES:  He   Allergies   Allergen Reactions    Amoxicillin-Pot Clavulanate     Ipratropium     Rivaroxaban     Vancomycin        REVIEW OF SYSTEMS   Pertinent ROS noted in HPI    PHYSICAL EXAM   [unfilled]   I/O last 3 completed shifts: In: 1017.7 [P.O.:60; I.V.:907.7;  IV Piggyback:50]  Out: 550 [Urine:550]      Physical Exam:  General appearance: alert, cooperative, no distress, appears stated age  Eyes: Anicteric  Head: Normocephalic, without obvious abnormality  Lungs: clear to auscultation bilaterally, on 5 L O2  Heart: regular rate and rhythm  Abdomen: soft, non-distended, non-tender. Bowel sounds normal  Extremities: No edema  Skin: warm and dry, no jaundice  Neuro: A&OX3, paraplegic       LABS AND IMAGING   Laboratory   Recent Labs     06/09/19  1354 06/10/19  0014 06/10/19  0509   WBC 13.0*  --  6.8   HGB 13.2* 11.7* 11.4*   HCT 41.3 36.1* 35.4*   MCV 86.1  --  85.3     --  279     Recent Labs     06/09/19  1354 06/10/19  0510   * 142   K 3.7 4.0   CL 91* 100   CO2 30 27   BUN 14 17   CREATININE 0.8 0.8     Recent Labs     06/09/19  1354   AST 43*   ALT 23   BILITOT <0.2   ALKPHOS 110     No results for input(s): LIPASE, AMYLASE in the last 72 hours. Recent Labs     06/09/19  1354   PROTIME 18.6*   INR 1.63*       Imaging  XR CHEST PORTABLE   Final Result   1. No acute cardiopulmonary disease. 2. COPD. ASSESSMENT AND RECOMMENDATIONS   80 y.o. male with a PMH of thoracic spine fracture complicated by OM with resultant paraplegia, CAD, AVR, COPD on supplemental O2, HTN, AVR, splenectomy who presented on 6/9/2019 with generalized weakness. We have been consulted regarding nausea, vomiting and black stools. IMPRESSION:  1. Nausea with reported dark emesis  2. Dark stools:  Fecal occult blood test negative  3. Generalized weakness  4. UTI    RECOMMENDATIONS:    EGD today. Keep NPO  On PPI gtt. Will continue for now pending EGD results. If you have any questions or need any further information, please feel free to contact our consult team.  Thank you for allowing us to participate in the care of Germain Bullock.     Annita Lennox PA-C

## 2019-06-10 NOTE — PROGRESS NOTES
Pt AAO x4. No c/o pain at this time. Admission assessment completed. IVF and Protonix gtt infusing without any difficulty. Pt is c/o nausea and continuously having small amount of emesis, yellow to green in color. PRN Zofran given. Denies further needs at this time. Call light in reach. Will monitor.

## 2019-06-10 NOTE — DISCHARGE INSTR - COC
Continuity of Care Form    Patient Name: Jolly Hall   :  1950  MRN:  [id number]    [facility] date:  2019  Discharge date:  2019    Code Status Order: Full Code   Advance Directives:   [facility] Documentation     Date/Time Healthcare Directive Type of Healthcare Directive Copy in [address] Agent's Name Healthcare Agent's Phone Number    [phone]  Yes, patient has an advance directive for healthcare treatment  --  --  --  --  --          Admitting Physician:  Allen Frank MD  PCP: Gagan German [id number]    Discharging Nurse: Sharmaine Gonzalez Unit/Room#: A8M-2167/3103-01  Discharging Unit Phone Number: [phone]    Emergency Contact:   Extended Emergency Contact Information  Primary Emergency Contact: Fadia Hamm  Address: [address] [place] [address] Phone: [phone]  Mobile Phone: [phone]  Relation: Spouse  Secondary Emergency Contact: Arianne [facility]  Mobile Phone: [phone]  Relation: Child    Past Surgical History:  Past Surgical History:   Procedure Laterality Date    AORTIC VALVE REPLACEMENT      SPLENECTOMY         Immunization History:   Immunization History   Administered Date(s) Administered    Influenza Virus Vaccine 10/03/2018       Active Problems:  Patient Active Problem List   Diagnosis Code    COPD exacerbation ([facility] [address] J44.1    Acute hypercapnic respiratory failure ([facility] [address] J96.02    Lethargy R53.83    Essential hypertension I10    Streptococcal bacteremia R78.81, B95.5    H/O splenectomy Z90.81    Positive blood culture R78.81    UTI (urinary tract infection) N39.0       Isolation/Infection:   Isolation          No Isolation            Nurse Assessment:  Last Vital Signs: /73   Pulse 68   Temp 97.7 °F (36.5 °C) (Oral)   Resp 16   Ht 5' 8\" (1.727 m)   Wt 163 lb 2.3 oz (74 kg)   SpO2 91%   BMI 24.81 kg/m²     Last documented pain score (0-10 scale): Pain Level: 0  Last Weight:   Wt Readings from Last 1 Encounters:   06/10/19 163 lb 2.3 oz (74 kg)     Mental Status:  oriented, alert, coherent, logical, thought processes intact and able to concentrate and follow conversation    IV Access:  - None    Nursing Mobility/ADLs:  Walking   Dependent  Transfer  Dependent  Bathing  Assisted  Dressing  Assisted  Toileting  Assisted  Feeding  Independent  Med [address]  Assisted  Med Delivery   whole    Wound Care Documentation and Therapy:        Elimination:  Continence:   · Bowel: Yes   · Bladder: Chronic Gtz  Urinary Catheter: Chronic Gtz not placed in hosptial; unsure of insertion date   Colostomy/Ileostomy/Ileal Conduit: No       Date of Last BM: 6/12/2019    Intake/Output Summary (Last 24 hours) at 6/10/2019 1041  Last data filed at 6/10/2019 0525  Gross per 24 hour   Intake 1017.7 ml   Output 550 ml   Net 467.7 ml     I/O last 3 completed shifts: In: 1017.7 [P.O.:60; I.V.:907.7; IV Piggyback:50]  Out: 550 [Urine:550]    Safety Concerns: At Risk for Falls    Impairments/Disabilities:      Paralysis - Paraplegic of the lower extremities but is able to move feet and has mild sensation in bilateral feet    Nutrition Therapy:  Current Nutrition Therapy:   - Oral Diet:  General    Routes of Feeding: Oral  Liquids: No Restrictions  Daily Fluid Restriction: no  Last Modified Barium Swallow with Video (Video Swallowing Test): not done    Treatments at the Time of Hospital Discharge:   Respiratory Treatments: Oxygen therapy, respiratory treatments  Oxygen Therapy:  is on oxygen at 5 L/min per nasal cannula.   Ventilator:    - No ventilator support    Rehab Therapies: None  Weight Bearing Status/Restrictions: No weight bearing restirctions  Other Medical Equipment (for information only, NOT a DME order):  wheelchair  Other Treatments: ***    Patient's personal belongings (please select all that are sent with patient):  Glasses, Dentures upper and lower    RN SIGNATURE:  Electronically signed by Everlina Bence, RN on 6/12/19 at 10:09 AM    CASE MANAGEMENT/SOCIAL WORK SECTION    Inpatient Status Date: 6/9/2019    Readmission Risk Assessment Score:  Readmission Risk              Risk of Unplanned Readmission:        14           Discharging to Facility/ Agency   · Name: [facility]   · Address:  · [facility]:[phone]  · Fax:    Dialysis Facility (if applicable)   · Name:  · Address:  · Dialysis Schedule:  · Phone:  · Fax:    / signature:Electronically signed by Ole Prater on 6/12/2019 at 10:57 AM     PHYSICIAN SECTION    Prognosis: Fair    Condition at Discharge: Stable    Rehab Potential (if transferring to Rehab): Good    Recommended Labs or Other Treatments After Discharge: monitor Hb in 1 week     Physician Certification: I certify the above information and transfer of Leticia Hernández  is necessary for the continuing treatment of the diagnosis listed and that he requires Home Care for less 30 days.      Update Admission H&P: No change in H&P    PHYSICIAN SIGNATURE:  Electronically signed by Jonatan Loya MD on 6/12/19 at 9:25 AM

## 2019-06-10 NOTE — OP NOTE
Endoscopy Note    Patient: Marivel Taveras  : 1950  Acct#:     Procedure: Esophagogastroduodenoscopy with argon plasma coagulation                         Date:  6/10/2019     Surgeon:   Normie Brittle, MD    Referring Physician:  Gagan German [id number]    Indications: 80 y. o. male with a PMH of thoracic spine fracture complicated by OM with resultant paraplegia, CAD, AVR, COPD on supplemental O2, HTN, AVR, splenectomy who presented on 2019 with generalized weakness, nausea and vomiting and possible coffee ground emesis    Postoperative Diagnosis:    1. 4 mm non-bleeding angioectasia on the gastric incisura, obliterated with APC. 2. Gastric erythema with nodular erosions in the proximal gastric body. Biopsies were not obtained due to elevated INR and concern for GI bleeding. Anesthesia:  MAC    Consent:  The patient or their legal guardian has signed an informed consent, and is aware of the potential risks, benefits, alternatives, and potential complications of this procedure. These include, but are not limited to hemorrhage, bleeding, post procedural pain, perforation, phlebitis, aspiration, hypotension, hypoxia, cardiovascular events such as arryhthmia, and possibly death. Description of Procedure: The patient was then taken to the endoscopy suite, placed in the left lateral decubitus position and the above IV sedation was administrered. The Olympus video endoscope was placed through the patient's oropharynx without difficulty to the extent of the 2nd portion of the duodenum. Both forward and retroflexed views of the stomach were obtained. Findings:    Esophagus: The esophagus appeared normal without evidence of Perales's esophagus or reflux esophagitis. Stomach: The stomach was viewed on forward and retroflexed views. A 4 mm non-bleeding angioectasia was seen on the gastric incisura, and was obliterated with APC.  Gastric erythema with nodular erosions were present in the

## 2019-06-10 NOTE — PROGRESS NOTES
Consent signed, preop checklist complete, ticket to ride in chart. Pt denies any questions/concerns at this time.  Electronically signed by Richard Lassiter RN on 6/10/2019 at 11:51 AM

## 2019-06-10 NOTE — PROGRESS NOTES
Nutrition Assessment    Type and Reason for Visit: Initial, Positive Nutrition Screen, Consult    Nutrition Recommendations:   Recommend diet free of therapeutic restrictions    Magic Cup BID  Will monitor nutritional adequacy, nutrition-related labs, weights, BMs, and clinical progress     Nutrition Assessment: Pt with nutrition risk r/t poor po intake for last few months r/t poor appetite d/t taste changes. Per EMR and Care Everywhere, weight loss of 40 lb over 2 months. Pt could not confirm this weight loss and has not noticed much difference in clothing, etc.  Further nutrition compromise possible r/t infection, paraplegia and risk of skin breakdown, hypogeusia, possible GI compromise. Pt does not like Ensure, will trial Magic Cup when po resumes. Malnutrition Assessment:  · Malnutrition Status: At risk for malnutrition  · Context: Chronic illness  · Findings of the 6 clinical characteristics of malnutrition (Minimum of 2 out of 6 clinical characteristics is required to make the diagnosis of moderate or severe Protein Calorie Malnutrition based on AND/ASPEN Guidelines):  1. Energy Intake-Less than or equal to 75% of estimated energy requirement, Greater than or equal to 1 month    2. Weight Loss-Unable to assess,    3. Fat Loss-No significant subcutaneous fat loss,    4. Muscle Loss-No significant muscle mass loss,    5. Fluid Accumulation-No significant fluid accumulation,    6.   Strength-Not measured    Nutrition Risk Level: High    Nutrient Needs:  · Estimated Daily Total Kcal: 4978-6889 kcal (25-28 kcal/kg ABW)  · Estimated Daily Protein (g): 89-96 gm (1.2-1.3 gm/kg ABW)  · Estimated Daily Total Fluid (ml/day): 1 ml/kcal     Nutrition Diagnosis:   · Problem: Inadequate oral intake  · Etiology: related to Insufficient energy/nutrient consumption     Signs and symptoms:  as evidenced by Diet history of poor intake    Objective Information:  · Nutrition-Focused Physical Findings: No physical signs of malnutrition observed   · Wound Type: (some red areas )  · Current Nutrition Therapies:  · Oral Diet Orders: NPO   · Oral Diet intake: NPO  · Oral Nutrition Supplement (ONS) Orders: None  · ONS intake: NPO  · Anthropometric Measures:  · Ht: 5' 8\" (172.7 cm)   · Current Body Wt: 163 lb (73.9 kg)  · Usual Body Wt: 200 lb (90.7 kg)  · Ideal Body Wt: 146 lb (66.2 kg)(adj for paraplegia ),   · BMI Classification: BMI 18.5 - 24.9 Normal Weight    Nutrition Interventions:   Continue current diet  Continued Inpatient Monitoring    Nutrition Evaluation:   · Evaluation: Goals set   · Goals:  Tolerate diet and consume greater than 50% of meals and supplements     · Monitoring: Meal Intake, Supplement Intake, Diet Tolerance, Weight, Skin Integrity, Nutrition Progression      Electronically signed by Casey Graham RD, LD on 6/10/19 at 11:00 AM    Contact Number: [phone]

## 2019-06-10 NOTE — PROGRESS NOTES
Progress Note  Admit Date: 2019      PCP: [facility]     CC: F/U for gen weakness    SUBJECTIVE / Interval History:  Feel better  Vomiting resolved with phenergan         Allergies  Amoxicillin-pot clavulanate; Ipratropium; Rivaroxaban; and Vancomycin    Medications    Scheduled Meds:   cefTRIAXone (ROCEPHIN) IV  1 g Intravenous Q24H    aspirin EC  81 mg Oral Daily    atorvastatin  20 mg Oral Nightly    mometasone-formoterol  2 puff Inhalation BID    losartan  50 mg Oral Daily    metoprolol tartrate  25 mg Oral BID    nicotine  1 patch Transdermal Q24H    tamsulosin  0.4 mg Oral Nightly    tiotropium  18 mcg Inhalation Daily    sodium chloride flush  10 mL Intravenous 2 times per day     Continuous Infusions:   pantoprozole (PROTONIX) infusion 8 mg/hr (06/10/19 0334)    sodium chloride 75 mL/hr at 19 192       PRN Meds:  albuterol sulfate HFA, nicotine polacrilex, albuterol, sodium chloride flush, acetaminophen, ondansetron    Vitals    TEMPERATURE:  Current - Temp: 98.2 °F (36.8 °C); Max - Temp  Av.8 °F (36.6 °C)  Min: 97.4 °F (36.3 °C)  Max: 98.4 °F (36.9 °C)  RESPIRATIONS RANGE: Resp  Av  Min: 14  Max: 27  PULSE RANGE: Pulse  Av.5  Min: 65  Max: 78  BLOOD PRESSURE RANGE:  Systolic (09MWG), ZOL:601 , Min:104 , UQM:960   ; Diastolic (95RHQ), MWX:94, Min:66, Max:95    PULSE OXIMETRY RANGE: SpO2  Av.8 %  Min: 90 %  Max: 99 %  24HR INTAKE/OUTPUT:      Intake/Output Summary (Last 24 hours) at 6/10/2019 0758  Last data filed at 6/10/2019 0525  Gross per 24 hour   Intake 1017.7 ml   Output 550 ml   Net 467.7 ml       Exam:    Gen: No distress. Eyes: PERRL. No sclera icterus. No conjunctival injection. ENT: No discharge. Pharynx clear. External appearance of ears and nose normal.  Neck: Trachea midline. No obvious mass. Resp: No accessory muscle use. No crackles. No wheezes. No rhonchi. No dullness on percussion. CV: Regular rate. Regular rhythm.  No murmur or rub. No edema. GI: Non-tender. Non-distended. No hernia. Skin: Warm, dry, normal texture and turgor. No nodule on exposed extremities. Lymph: No cervical LAD. No supraclavicular LAD. M/S: No cyanosis. No clubbing. No joint deformity. Neuro: paraplegia . CN 2-12 tested, no defect noted. Psych: Oriented x 3. No anxiety. Awake. Alert. Intact judgement and insight. Data    LABS  CBC:   Recent Labs     06/09/19  1354 06/10/19  0014 06/10/19  0509   WBC 13.0*  --  6.8   HGB 13.2* 11.7* 11.4*   HCT 41.3 36.1* 35.4*   MCV 86.1  --  85.3     --  279     BMP:   Recent Labs     06/09/19  1354 06/10/19  0510   * 142   K 3.7 4.0   CL 91* 100   CO2 30 27   BUN 14 17   CREATININE 0.8 0.8     POC GLUCOSE:  No results for input(s): POCGLU in the last 72 hours. LIVER PROFILE:   Recent Labs     06/09/19  1354   AST 43*   ALT 23   LABALBU 3.6   BILITOT <0.2   ALKPHOS 110     PT/INR:   Recent Labs     06/09/19  1354   PROTIME 18.6*   INR 1.63*     APTT: No results for input(s): APTT in the last 72 hours. UA:  Recent Labs     06/09/19  1520 06/09/19  1538   COLORU YELLOW  --    PHUR 7.5  --    WBCUA  --  318*   RBCUA  --  20-50*   YEAST  --  Present*   BACTERIA  --  4+*   CLARITYU TURBID*  --    SPECGRAV 1.015  --    LEUKOCYTESUR LARGE*  --    UROBILINOGEN 0.2  --    BILIRUBINUR SMALL*  --    BLOODU MODERATE*  --    GLUCOSEU Negative  --    KETUA Negative  --      Microbiology:  Wound Culture: No results for input(s): WNDABS, ORG in the last 72 hours. Invalid input(s):  LABGRAM  Nasal Culture: No results for input(s): ORG, MRSAPCR in the last 72 hours. Blood Culture: No results for input(s): BC, BLOODCULT2 in the last 72 hours. Fungal Culture:   No results for input(s): FUNGSM in the last 72 hours. No results for input(s): FUNCXBLD in the last 72 hours.   CSF Culture:  No results for input(s): COLORCSF, APPEARCSF, CFTUBE, CLOTCSF, WBCCSF, RBCCSF, NEUTCSF, NUMCELLSCSF, LYMPHSCSF, MONOCSF, GLUCCSF,

## 2019-06-10 NOTE — PLAN OF CARE
Problem: Falls - Risk of:  Goal: Will remain free from falls  Description  Will remain free from falls  Outcome: Ongoing  Goal: Absence of physical injury  Description  Absence of physical injury  Outcome: Ongoing   Fall risk assessment completed every shift. All precautions in place. Pt has call light within reach at all times. Room clear of clutter. Pt aware to call for assistance when getting up. Problem: Risk for Impaired Skin Integrity  Goal: Tissue integrity - skin and mucous membranes  Description  Structural intactness and normal physiological function of skin and  mucous membranes. Outcome: Ongoing   Skin assessment completed every shift. Pt assessed for incontinence, appropriate barrier wipes used prn. Pt encouraged to turn/rotate every 2 hours. Assistance provided if pt unable to do so themselves. Problem: Sensory:  Goal: General experience of comfort will improve  Description  General experience of comfort will improve  Outcome: Ongoing     Problem: Urinary Elimination:  Goal: Signs and symptoms of infection will decrease  Description  Signs and symptoms of infection will decrease  Outcome: Ongoing  Pt has a chronic colby. Does c/o burning at insertion site. Will continue to monitor.   Goal: Ability to reestablish a normal urinary elimination pattern will improve - after catheter removal  Description  Ability to reestablish a normal urinary elimination pattern will improve  Outcome: Ongoing  Goal: Complications related to the disease process, condition or treatment will be avoided or minimized  Description  Complications related to the disease process, condition or treatment will be avoided or minimized  Outcome: Ongoing

## 2019-06-10 NOTE — CARE COORDINATION
INITIAL CASE MANAGEMENT ASSESSMENT    Reviewed chart, met with patient to assess possible discharge needs. Explained Case Management role/services. Living Situation:  Patient lives in a one story home with spouse who is supportive. ADLs: manges own personal care     DME: has home O2    PT/OT Recs: not ordered     Active Services: [facility]. She confirmed services with [address] [place] [address] for Rn current; PT/OT ended on 5/29/19.   [phone]))     Transportation: Patient has transport through the [place]. Spouse also transports. Medications: obtains through [place]    PCP:  Sees Rogerio Rizvi at the [place]        PLAN/COMMENTS: [facility]. SW/CM provided contact information for patient or family to call with any questions. SW/CM will follow and assist as needed.     Electronically signed by Shelva Lesches on 6/10/2019 at 10:40 AM

## 2019-06-10 NOTE — PROGRESS NOTES
1 time dose of Phenergan IM that was given at 0020 was more effective than Zofran was. This am pt is not c/o nausea or pain. VS remained stable overnight. Pt did not have any BM. Denies needs at this time. Call light in reach. Will monitor.

## 2019-06-10 NOTE — PROGRESS NOTES
Pt resting in bed with RT present in room. PT denies any needs at this time. Call light within reach.  Electronically signed by Royal Tyler RN on 6/10/2019 at 7:51 AM

## 2019-06-10 NOTE — ANESTHESIA POSTPROCEDURE EVALUATION
Department of Anesthesiology  Postprocedure Note    Patient: Cheryl Olsen  MRN: [id number]  YOB: 1950  Date of evaluation: 6/10/2019  Time:  4:17 PM     Procedure Summary     Date:  06/10/19 Room / Location:  [place] [address] / [facility] ENDOSCOPY    Anesthesia Start:  5958 Anesthesia Stop:  8788    Procedure:  EGD  WITH ARGON PLASMA COAGULATION (N/A ) Diagnosis:  (coffee ground emesis)    Surgeon:  Florence Matute MD Responsible Provider:  Thuan Handy MD    Anesthesia Type:  general ASA Status:  3          Anesthesia Type: general    Hayley Phase I: Hayley Score: 9    Hayley Phase II:      Last vitals: Reviewed and per EMR flowsheets.        Anesthesia Post Evaluation    Patient location during evaluation: PACU  Level of consciousness: awake and alert  Airway patency: patent  Nausea & Vomiting: no nausea and no vomiting  Complications: no  Cardiovascular status: blood pressure returned to baseline  Respiratory status: acceptable  Hydration status: euvolemic  Comments: Postoperative Anesthesia Note    Name:    Cheryl Olsen  MRN:      [id number]    Patient Vitals in the past 12 hrs:  06/10/19 1544, BP:(!) 93/56, Temp:98 °F (36.7 °C), Temp src:Oral, Pulse:53, Resp:16, SpO2:94 %  06/10/19 1515, Pulse:59, Resp:15, SpO2:93 %  06/10/19 1514, Pulse:61, Resp:14, SpO2:91 %  06/10/19 1513, BP:(!) 99/57, Pulse:62, Resp:20, SpO2:90 %  06/10/19 1512, Pulse:62, Resp:20, SpO2:91 %  06/10/19 1509, Pulse:72, Resp:20, SpO2:90 %  06/10/19 1508, Pulse:64, Resp:16, SpO2:90 %  06/10/19 1507, BP:97/60, Pulse:62, Resp:21, SpO2:91 %  06/10/19 1458, Pulse:67, Resp:20, SpO2:93 %  06/10/19 1457, Pulse:69, Resp:21, SpO2:94 %  06/10/19 1456, BP:(!) 87/53, Pulse:69, Resp:18, SpO2:95 %  06/10/19 1455, Pulse:68, Resp:23, SpO2:95 %  06/10/19 1452, BP:(!) 87/47, Pulse:66, Resp:23, SpO2:95 %  06/10/19 1451, Pulse:68, Resp:24, SpO2:95 %  06/10/19 1450, Pulse:68, Resp:21, SpO2:96 %  06/10/19 1449, BP:(!) 89/54, Pulse:63, Resp:22, SpO2:95 %  06/10/19 1445, BP:(!) 77/44, Pulse:75, Resp:17, SpO2:90 %  06/10/19 1443, Temp:98.3 °F (36.8 °C), Temp src:Temporal  06/10/19 1225, BP:108/60, Temp:98.2 °F (36.8 °C), Temp src:Temporal, Pulse:67, SpO2:92 %  06/10/19 1115, BP:110/69, Temp:98.6 °F (37 °C), Temp src:Oral, Pulse:70, Resp:18, SpO2:92 %  06/10/19 0836, BP:107/73, Temp:97.7 °F (36.5 °C), Temp src:Oral, Pulse:68, Resp:16, SpO2:91 %  06/10/19 0748, Resp:18, SpO2:92 %  06/10/19 0430, BP:104/67, Temp:98.2 °F (36.8 °C), Temp src:Oral, Pulse:67, Resp:16, SpO2:91 %     LABS:    CBC  Lab Results       Component                Value               Date/Time                  WBC                      6.8                 06/10/2019 05:09 AM        HGB                      11.4 (L)            06/10/2019 05:09 AM        HCT                      35.4 (L)            06/10/2019 05:09 AM        PLT                      279                 06/10/2019 05:09 AM   RENAL  Lab Results       Component                Value               Date/Time                  NA                       142                 06/10/2019 05:10 AM        K                        4.0                 06/10/2019 05:10 AM        CL                       100                 06/10/2019 05:10 AM        CO2                      27                  06/10/2019 05:10 AM        BUN                      17                  06/10/2019 05:10 AM        CREATININE               0.8                 06/10/2019 05:10 AM        GLUCOSE                  99                  06/10/2019 05:10 AM   COAGS  Lab Results       Component                Value               Date/Time                  PROTIME                  18.6 (H)            06/09/2019 01:54 PM        INR                      1.63 (H)            06/09/2019 01:54 PM     Intake & Output:  @19BNEK@    Nausea & Vomiting:  No    Level of Consciousness:  Awake    Pain Assessment:  Adequate analgesia    Anesthesia Complications:  No apparent anesthetic complications    SUMMARY Vital signs stable  OK to discharge from Stage I post anesthesia care.   Care transferred from Anesthesiology department on discharge from perioperative area

## 2019-06-10 NOTE — PLAN OF CARE
Problem: Nutrition  Goal: Optimal nutrition therapy  Outcome: Ongoing    Nutrition Problem: Inadequate oral intake  Intervention: Food and/or Nutrient Delivery: Continue current diet  Nutritional Goals:  Tolerate diet and consume greater than 50% of meals and supplements

## 2019-06-10 NOTE — ANESTHESIA PRE PROCEDURE
Department of Anesthesiology  Preprocedure Note       Name:  Leah Hou   Age:  80 y.o.  :  1950                                          MRN:  [id number]         Date:  6/10/2019      Surgeon: Glenroy Nielsen):  Shannan Cantu MD    Procedure: EGD DIAGNOSTIC ONLY (N/A )    Medications prior to admission:   Prior to Admission medications    Medication Sig Start Date End Date Taking? Authorizing Provider   vitamin D (CHOLECALCIFEROL) 1000 units TABS tablet Take 1,000 Units by mouth daily   Yes Historical Provider, MD   doxycycline hyclate (VIBRA-TABS) 100 MG tablet Take 100 mg by mouth 2 times daily 19  Yes Historical Provider, MD   baclofen (LIORESAL) 10 MG tablet Take 10 mg by mouth 3 times daily 10mg qam and midday  And 20mg hs   Yes Historical Provider, MD   ferrous sulfate 325 (65 Fe) MG EC tablet Take 325 mg by mouth 2 times daily   Yes Historical Provider, MD   gabapentin (NEURONTIN) 600 MG tablet Take 600 mg by mouth 3 times daily.    Yes Historical Provider, MD   potassium chloride (KLOR-CON M) 20 MEQ extended release tablet Take 20 mEq by mouth daily   Yes Historical Provider, MD   torsemide (DEMADEX) 20 MG tablet Take 40 mg by mouth daily   Yes Historical Provider, MD   melatonin 3 MG TABS tablet Take 6 mg by mouth nightly as needed   Yes Historical Provider, MD   apixaban (ELIQUIS) 5 MG TABS tablet Take 5 mg by mouth 2 times daily   Yes Historical Provider, MD   albuterol (PROVENTIL) (2.5 MG/3ML) 0.083% nebulizer solution Take 2.5 mg by nebulization every 6 hours as needed for Wheezing    Historical Provider, MD   albuterol sulfate  (90 Base) MCG/ACT inhaler Inhale 2 puffs into the lungs every 6 hours as needed for Wheezing    Historical Provider, MD   aspirin EC 81 MG EC tablet Take 81 mg by mouth daily    Historical Provider, MD   budesonide-formoterol (SYMBICORT) 80-4.5 MCG/ACT AERO Inhale 2 puffs into the lungs 2 times daily    Historical Provider, MD   metoprolol tartrate (LOPRESSOR) 25  tamsulosin (FLOMAX) capsule 0.4 mg  0.4 mg Oral Nightly Dada Scott Frederick MD   0.4 mg at 06/09/19 2142    tiotropium (SPIRIVA) inhalation capsule 18 mcg  18 mcg Inhalation Daily Dada Scott Frederick MD   18 mcg at 06/10/19 0745    albuterol (PROVENTIL) nebulizer solution 2.5 mg  2.5 mg Nebulization Q6H PRN Dada Frederick MD        sodium chloride flush 0.9 % injection 10 mL  10 mL Intravenous 2 times per day Dada Prescott MD   10 mL at 06/09/19 1928    sodium chloride flush 0.9 % injection 10 mL  10 mL Intravenous PRN Dada Prescott MD        acetaminophen (TYLENOL) tablet 650 mg  650 mg Oral Q4H PRN Dada Frederick MD        ondansetron (ZOFRAN) injection 4 mg  4 mg Intravenous Q6H PRN Dada Prescott MD   4 mg at 06/09/19 1927    0.9 % sodium chloride infusion   Intravenous Continuous Dada Scott Frederick MD 75 mL/hr at 06/10/19 3916         Allergies: Allergies   Allergen Reactions    Amoxicillin-Pot Clavulanate     Ipratropium     Rivaroxaban     Vancomycin        Problem List:    Patient Active Problem List   Diagnosis Code    COPD exacerbation ([facility] [address] J44.1    Acute hypercapnic respiratory failure (HCC) J96.02    Lethargy R53.83    Essential hypertension I10    Streptococcal bacteremia R78.81, B95.5    H/O splenectomy Z90.81    Positive blood culture R78.81    UTI (urinary tract infection) N39.0       Past Medical History:        Diagnosis Date    Aortic valve replaced     COPD (chronic obstructive pulmonary disease) ([facility] [address]     Hypertension     Paraplegia ([facility] [address]        Past Surgical History:        Procedure Laterality Date    AORTIC VALVE REPLACEMENT      SPLENECTOMY         Social History:    Social History     Tobacco Use    Smoking status: Never Smoker    Smokeless tobacco: Never Used   Substance Use Topics    Alcohol use:  No                                Counseling given: Not Answered      Vital Signs (Current):   Vitals:    06/10/19 0748 06/10/19 3575 06/10/19 1115 06/10/19 1225   BP:  107/73 110/69 108/60   Pulse:  68 70 67   Resp: 18 16 18    Temp:  97.7 °F (36.5 °C) 98.6 °F (37 °C) 98.2 °F (36.8 °C)   TempSrc:  Oral Oral Temporal   SpO2: 92% 91% 92% 92%   Weight:       Height:                                                  BP Readings from Last 3 Encounters:   06/10/19 108/60   10/21/18 127/79       NPO Status: Time of last liquid consumption: 1100                        Time of last solid consumption: 0000                        Date of last liquid consumption: 06/10/19(ice chips until 11 am)                        Date of last solid food consumption: 06/07/19    BMI:   Wt Readings from Last 3 Encounters:   06/10/19 163 lb 2.3 oz (74 kg)   10/21/18 208 lb 8.9 oz (94.6 kg)     Body mass index is 24.81 kg/m². CBC:   Lab Results   Component Value Date    WBC 6.8 06/10/2019    RBC 4.15 06/10/2019    HGB 11.4 06/10/2019    HCT 35.4 06/10/2019    MCV 85.3 06/10/2019    RDW 19.2 06/10/2019     06/10/2019       CMP:   Lab Results   Component Value Date     06/10/2019    K 4.0 06/10/2019     06/10/2019    CO2 27 06/10/2019    BUN 17 06/10/2019    CREATININE 0.8 06/10/2019    GFRAA >60 06/10/2019    AGRATIO 0.8 06/09/2019    LABGLOM >60 06/10/2019    GLUCOSE 99 06/10/2019    PROT 8.4 06/09/2019    CALCIUM 9.5 06/10/2019    BILITOT <0.2 06/09/2019    ALKPHOS 110 06/09/2019    AST 43 06/09/2019    ALT 23 06/09/2019       POC Tests: No results for input(s): POCGLU, POCNA, POCK, POCCL, POCBUN, POCHEMO, POCHCT in the last 72 hours.     Coags:   Lab Results   Component Value Date    PROTIME 18.6 06/09/2019    INR 1.63 06/09/2019       HCG (If Applicable): No results found for: PREGTESTUR, PREGSERUM, HCG, HCGQUANT     ABGs:   Lab Results   Component Value Date    PHART 7.342 10/14/2018    PO2ART 72.1 10/14/2018    GQK6YCE 57.2 10/14/2018    NPQ4PCP 30.2 10/14/2018    BEART 3.3 10/14/2018    D7WURRBH 93.7 10/14/2018        Type & Screen (If Applicable):  No results found for: LABABO, LABRH    Anesthesia Evaluation    Airway: Mallampati: III  TM distance: >3 FB   Neck ROM: full  Mouth opening: > = 3 FB Dental:          Pulmonary:   (+) COPD:                             Cardiovascular:    (+) hypertension:,                   Neuro/Psych:   (+) neuromuscular disease:,             GI/Hepatic/Renal:             Endo/Other:                     Abdominal:           Vascular:                                        Anesthesia Plan      general     ASA 3     (I discussed with the patient the risks and benefits of PIV, general anesthesia, IV Narcotics, PACU. All questions were answered the patient agrees with the plan.)  Induction: intravenous. Anesthetic plan and risks discussed with patient. Plan discussed with CRNA.                   Rosey Lamas MD   6/10/2019

## 2019-06-10 NOTE — PROGRESS NOTES
Pt arrived to pacu from endo asleep. NBP 77/44 Kiel CRNA at bedside gives mahnaz IV BP up to 89/54. Other VSS on monitor. O2 on 3L nc. Ab soft. Pt mumbles when aroused by v/o. Pt falls easily back to sleep.

## 2019-06-10 NOTE — PLAN OF CARE
Problem: Falls - Risk of:  Goal: Will remain free from falls  Description  Will remain free from falls  Outcome: Ongoing  Note:   Fall risk assessment completed. Fall precautions in place. Call light within reach. Pt educated on calling for assistance before getting up. Walkway free of clutter. Patient bed alarm intact, encouraged patient to call for assistance especially if feeling dizzy, SOB, or weakness. Will continue to monitor. Electronically signed by Andres Fraire RN on 6/10/2019 at 5:43 PM      Goal: Absence of physical injury  Description  Absence of physical injury  Outcome: Ongoing     Problem: Risk for Impaired Skin Integrity  Goal: Tissue integrity - skin and mucous membranes  Description  Structural intactness and normal physiological function of skin and  mucous membranes. Outcome: Ongoing  Note:   Pt assessed for skin break down. Skin was warm and dry to touch. Pt cannot regulate head of bed without assistance. Pt being turned or repositioned at least every 2 hours to prevent skin breakdown. Will continue to monitor and assess. Electronically signed by Andres Fraire RN on 6/10/2019 at 5:44 PM             Problem: Sensory:  Goal: General experience of comfort will improve  Description  General experience of comfort will improve  Outcome: Ongoing  Note:   Pt much more comfortable at this time.  Electronically signed by Andres Fraire RN on 6/10/2019 at 5:44 PM       Problem: Urinary Elimination:  Goal: Signs and symptoms of infection will decrease  Description  Signs and symptoms of infection will decrease  Outcome: Ongoing  Goal: Ability to reestablish a normal urinary elimination pattern will improve - after catheter removal  Description  Ability to reestablish a normal urinary elimination pattern will improve  Outcome: Ongoing  Goal: Complications related to the disease process, condition or treatment will be avoided or minimized  Description  Complications related to the disease process, condition or treatment will be avoided or minimized  Outcome: Ongoing     Problem: Nutrition  Goal: Optimal nutrition therapy  6/10/2019 1743 by Larisa Byers RN  Outcome: Ongoing  6/10/2019 1103 by Rosanne Gaucher, RD, LD  Outcome: Ongoing

## 2019-06-11 LAB
HCT VFR BLD CALC: 34.3 % (ref 40.5–52.5)
HEMOGLOBIN: 11.1 G/DL (ref 13.5–17.5)
MCH RBC QN AUTO: 27.7 PG (ref 26–34)
MCHC RBC AUTO-ENTMCNC: 32.4 G/DL (ref 31–36)
MCV RBC AUTO: 85.4 FL (ref 80–100)
PDW BLD-RTO: 19.4 % (ref 12.4–15.4)
PLATELET # BLD: 273 K/UL (ref 135–450)
PMV BLD AUTO: 10.9 FL (ref 5–10.5)
RBC # BLD: 4.01 M/UL (ref 4.2–5.9)
URINE CULTURE, ROUTINE: NORMAL
WBC # BLD: 11 K/UL (ref 4–11)

## 2019-06-11 PROCEDURE — 1200000000 HC SEMI PRIVATE

## 2019-06-11 PROCEDURE — 2580000003 HC RX 258: Performed by: INTERNAL MEDICINE

## 2019-06-11 PROCEDURE — 6370000000 HC RX 637 (ALT 250 FOR IP): Performed by: INTERNAL MEDICINE

## 2019-06-11 PROCEDURE — 6360000002 HC RX W HCPCS: Performed by: INTERNAL MEDICINE

## 2019-06-11 PROCEDURE — 2700000000 HC OXYGEN THERAPY PER DAY

## 2019-06-11 PROCEDURE — 94640 AIRWAY INHALATION TREATMENT: CPT

## 2019-06-11 PROCEDURE — 85027 COMPLETE CBC AUTOMATED: CPT

## 2019-06-11 PROCEDURE — 94760 N-INVAS EAR/PLS OXIMETRY 1: CPT

## 2019-06-11 PROCEDURE — 36415 COLL VENOUS BLD VENIPUNCTURE: CPT

## 2019-06-11 RX ADMIN — MOMETASONE FUROATE AND FORMOTEROL FUMARATE DIHYDRATE 2 PUFF: 100; 5 AEROSOL RESPIRATORY (INHALATION) at 20:54

## 2019-06-11 RX ADMIN — SODIUM CHLORIDE, PRESERVATIVE FREE 10 ML: 5 INJECTION INTRAVENOUS at 21:41

## 2019-06-11 RX ADMIN — MOMETASONE FUROATE AND FORMOTEROL FUMARATE DIHYDRATE 2 PUFF: 100; 5 AEROSOL RESPIRATORY (INHALATION) at 08:59

## 2019-06-11 RX ADMIN — LOSARTAN POTASSIUM 50 MG: 50 TABLET ORAL at 09:20

## 2019-06-11 RX ADMIN — CEFTRIAXONE 1 G: 1 INJECTION, POWDER, FOR SOLUTION INTRAMUSCULAR; INTRAVENOUS at 18:34

## 2019-06-11 RX ADMIN — TAMSULOSIN HYDROCHLORIDE 0.4 MG: 0.4 CAPSULE ORAL at 21:40

## 2019-06-11 RX ADMIN — ATORVASTATIN CALCIUM 20 MG: 20 TABLET, FILM COATED ORAL at 21:40

## 2019-06-11 RX ADMIN — METOPROLOL TARTRATE 12.5 MG: 25 TABLET ORAL at 21:40

## 2019-06-11 RX ADMIN — ASPIRIN 81 MG: 81 TABLET, COATED ORAL at 09:20

## 2019-06-11 RX ADMIN — TIOTROPIUM BROMIDE 18 MCG: 18 CAPSULE ORAL; RESPIRATORY (INHALATION) at 08:59

## 2019-06-11 ASSESSMENT — PAIN SCALES - GENERAL
PAINLEVEL_OUTOF10: 0
PAINLEVEL_OUTOF10: 0

## 2019-06-11 NOTE — PROGRESS NOTES
Progress Note  Admit Date: 2019      PCP: [facility]     CC: F/U for gen weakness    SUBJECTIVE / Interval History:  Feels better  Multiple black colored stools         Allergies  Amoxicillin-pot clavulanate; Ipratropium; Rivaroxaban; and Vancomycin    Medications    Scheduled Meds:   metoprolol tartrate  12.5 mg Oral BID    cefTRIAXone (ROCEPHIN) IV  1 g Intravenous Q24H    aspirin EC  81 mg Oral Daily    atorvastatin  20 mg Oral Nightly    mometasone-formoterol  2 puff Inhalation BID    losartan  50 mg Oral Daily    nicotine  1 patch Transdermal Q24H    tamsulosin  0.4 mg Oral Nightly    tiotropium  18 mcg Inhalation Daily    sodium chloride flush  10 mL Intravenous 2 times per day     Continuous Infusions:   sodium chloride 75 mL/hr at 06/10/19 0927       PRN Meds:  albuterol sulfate HFA, nicotine polacrilex, albuterol, sodium chloride flush, acetaminophen, ondansetron    Vitals    TEMPERATURE:  Current - Temp: 98.1 °F (36.7 °C); Max - Temp  Av.3 °F (36.8 °C)  Min: 98 °F (36.7 °C)  Max: 98.6 °F (37 °C)  RESPIRATIONS RANGE: Resp  Av.9  Min: 11  Max: 24  PULSE RANGE: Pulse  Av.4  Min: 47  Max: 75  BLOOD PRESSURE RANGE:  Systolic (14LFQ), QRD:215 , Min:72 , VGK:139   ; Diastolic (90DJZ), YQI:92, Min:37, Max:77    PULSE OXIMETRY RANGE: SpO2  Av.2 %  Min: 87 %  Max: 97 %  24HR INTAKE/OUTPUT:      Intake/Output Summary (Last 24 hours) at 2019 1243  Last data filed at 2019 0910  Gross per 24 hour   Intake 2742 ml   Output 350 ml   Net 2392 ml       Exam:    Gen: No distress. Eyes: PERRL. No sclera icterus. No conjunctival injection. ENT: No discharge. Pharynx clear. External appearance of ears and nose normal.  Neck: Trachea midline. No obvious mass. Resp: No accessory muscle use. No crackles. No wheezes. No rhonchi. No dullness on percussion. CV: Regular rate. Regular rhythm. No murmur or rub. No edema. GI: Non-tender. Non-distended. No hernia.    Skin: Warm, dry, normal texture and turgor. No nodule on exposed extremities. Lymph: No cervical LAD. No supraclavicular LAD. M/S: No cyanosis. No clubbing. No joint deformity. Neuro: paraplegia . CN 2-12 tested, no defect noted. Psych: Oriented x 3. No anxiety. Awake. Alert. Intact judgement and insight. Data    LABS  CBC:   Recent Labs     06/09/19  1354 06/10/19  0014 06/10/19  0509 06/11/19  1050   WBC 13.0*  --  6.8 11.0   HGB 13.2* 11.7* 11.4* 11.1*   HCT 41.3 36.1* 35.4* 34.3*   MCV 86.1  --  85.3 85.4     --  279 273     BMP:   Recent Labs     06/09/19  1354 06/10/19  0510   * 142   K 3.7 4.0   CL 91* 100   CO2 30 27   BUN 14 17   CREATININE 0.8 0.8     POC GLUCOSE:  No results for input(s): POCGLU in the last 72 hours. LIVER PROFILE:   Recent Labs     06/09/19  1354   AST 43*   ALT 23   LABALBU 3.6   BILITOT <0.2   ALKPHOS 110     PT/INR:   Recent Labs     06/09/19  1354   PROTIME 18.6*   INR 1.63*     APTT: No results for input(s): APTT in the last 72 hours. UA:  Recent Labs     06/09/19  1520 06/09/19  1538   COLORU YELLOW  --    PHUR 7.5  --    WBCUA  --  318*   RBCUA  --  20-50*   YEAST  --  Present*   BACTERIA  --  4+*   CLARITYU TURBID*  --    SPECGRAV 1.015  --    LEUKOCYTESUR LARGE*  --    UROBILINOGEN 0.2  --    BILIRUBINUR SMALL*  --    BLOODU MODERATE*  --    GLUCOSEU Negative  --    KETUA Negative  --      Microbiology:  Wound Culture: No results for input(s): WNDABS, ORG in the last 72 hours. Invalid input(s):  LABGRAM  Nasal Culture: No results for input(s): ORG, MRSAPCR in the last 72 hours. Blood Culture: No results for input(s): BC, BLOODCULT2 in the last 72 hours. Fungal Culture:   No results for input(s): FUNGSM in the last 72 hours. No results for input(s): FUNCXBLD in the last 72 hours.   CSF Culture:  No results for input(s): COLORCSF, APPEARCSF, CFTUBE, CLOTCSF, WBCCSF, RBCCSF, NEUTCSF, NUMCELLSCSF, LYMPHSCSF, MONOCSF, GLUCCSF, VOLCSF in the last 72

## 2019-06-11 NOTE — PROGRESS NOTES
Pt resting in bed watching tv. Pt denies nausea/pain. Respirations easy/even, 5L O2 via nasal cannula, home dependent. Pt denies any needs at this time, call light within reach.  Electronically signed by Amanda Hilton RN on 6/11/2019 at 1:55 PM

## 2019-06-11 NOTE — PLAN OF CARE
Problem: Falls - Risk of:  Goal: Will remain free from falls  Description  Will remain free from falls  6/11/2019 0354 by Diane Jackman RN  Outcome: Ongoing  6/10/2019 1743 by Tiffanie Palomino RN  Outcome: Ongoing  Note:   Fall risk assessment completed. Fall precautions in place. Call light within reach. Pt educated on calling for assistance before getting up. Walkway free of clutter. Patient bed alarm intact, encouraged patient to call for assistance especially if feeling dizzy, SOB, or weakness. Will continue to monitor. Electronically signed by Tiffanie Palomino RN on 6/10/2019 at 5:43 PM      Goal: Absence of physical injury  Description  Absence of physical injury  6/11/2019 0354 by Diane Jackman RN  Outcome: Ongoing  6/10/2019 1743 by Tiffanie Palomino RN  Outcome: Ongoing     Problem: Risk for Impaired Skin Integrity  Goal: Tissue integrity - skin and mucous membranes  Description  Structural intactness and normal physiological function of skin and  mucous membranes. 6/11/2019 0354 by Diane Jackman RN  Outcome: Ongoing  6/10/2019 1743 by Tiffanie Palomino RN  Outcome: Ongoing  Note:   Pt assessed for skin break down. Skin was warm and dry to touch. Pt cannot regulate head of bed without assistance. Pt being turned or repositioned at least every 2 hours to prevent skin breakdown. Will continue to monitor and assess. Electronically signed by Tiffanie Palomino RN on 6/10/2019 at 5:44 PM             Problem: Sensory:  Goal: General experience of comfort will improve  Description  General experience of comfort will improve  6/11/2019 0354 by Diane Jackman RN  Outcome: Ongoing  6/10/2019 1743 by Tiffanie Palomino RN  Outcome: Ongoing  Note:   Pt much more comfortable at this time.  Electronically signed by Tiffanie Palomino RN on 6/10/2019 at 5:44 PM

## 2019-06-11 NOTE — PROGRESS NOTES
Pt resting in bed w/eyes closed. No facial grimace/respirations easy/even. Bed in lowest position, wheels locked, bed exit alarm in place, call light within reach, and non skid footwear on. Walkway free of clutter. Will continue to monitor.  Electronically signed by Amanda Hilton RN on 6/11/2019 at 7:16 AM

## 2019-06-11 NOTE — PLAN OF CARE
Ongoing  Note:   Pt assessed for skin break down. Skin was warm and dry to touch. Pt cannot regulate head of bed without assistance. Pt being turned or repositioned at least every 2 hours to prevent skin breakdown. Will continue to monitor and assess. Electronically signed by Jada León RN on 6/10/2019 at 5:44 PM             Problem: Sensory:  Goal: General experience of comfort will improve  Description  General experience of comfort will improve  6/11/2019 0716 by Jada León RN  Outcome: Ongoing  Note:   Chronic colby in place, urine cloudy. Pt receiving IV ABX. Electronically signed by Jada León RN on 6/11/2019 at 7:17 AM    6/11/2019 0354 by Sukhi Chris RN  Outcome: Ongoing  6/10/2019 1743 by Jada León RN  Outcome: Ongoing  Note:   Pt much more comfortable at this time.  Electronically signed by Jada León RN on 6/10/2019 at 5:44 PM       Problem: Urinary Elimination:  Goal: Signs and symptoms of infection will decrease  Description  Signs and symptoms of infection will decrease  6/11/2019 0716 by Jada León RN  Outcome: Ongoing  6/11/2019 0354 by Sukhi Chris RN  Outcome: Ongoing  6/10/2019 1743 by Jada León RN  Outcome: Ongoing  Goal: Ability to reestablish a normal urinary elimination pattern will improve - after catheter removal  Description  Ability to reestablish a normal urinary elimination pattern will improve  6/11/2019 0716 by Jada León RN  Outcome: Ongoing  6/11/2019 0354 by Sukhi Chris RN  Outcome: Ongoing  6/10/2019 1743 by Jada León RN  Outcome: Ongoing  Goal: Complications related to the disease process, condition or treatment will be avoided or minimized  Description  Complications related to the disease process, condition or treatment will be avoided or minimized  6/11/2019 0716 by Jada León RN  Outcome: Ongoing  6/11/2019 0354 by Sukhi Chris RN  Outcome: Ongoing  6/10/2019 1743 by Jada León RN  Outcome: Ongoing     Problem: Nutrition  Goal: Optimal nutrition therapy  6/11/2019 0716 by Urmila Patterson RN  Outcome: Ongoing  6/11/2019 0354 by Amanda Disla RN  Outcome: Ongoing  6/10/2019 1743 by Urmila Patterson RN  Outcome: Ongoing

## 2019-06-12 VITALS
DIASTOLIC BLOOD PRESSURE: 76 MMHG | TEMPERATURE: 98.3 F | BODY MASS INDEX: 28.7 KG/M2 | HEIGHT: 68 IN | OXYGEN SATURATION: 96 % | RESPIRATION RATE: 16 BRPM | SYSTOLIC BLOOD PRESSURE: 146 MMHG | HEART RATE: 48 BPM | WEIGHT: 189.38 LBS

## 2019-06-12 PROBLEM — J96.10 CHRONIC RESPIRATORY FAILURE (HCC): Status: ACTIVE | Noted: 2019-06-12

## 2019-06-12 PROBLEM — K29.71 GASTROINTESTINAL HEMORRHAGE ASSOCIATED WITH GASTRITIS: Status: ACTIVE | Noted: 2019-06-12

## 2019-06-12 LAB
HCT VFR BLD CALC: 35.8 % (ref 40.5–52.5)
HEMOGLOBIN: 11.4 G/DL (ref 13.5–17.5)
MCH RBC QN AUTO: 27.7 PG (ref 26–34)
MCHC RBC AUTO-ENTMCNC: 31.7 G/DL (ref 31–36)
MCV RBC AUTO: 87.3 FL (ref 80–100)
PDW BLD-RTO: 19.4 % (ref 12.4–15.4)
PLATELET # BLD: 262 K/UL (ref 135–450)
PMV BLD AUTO: 11 FL (ref 5–10.5)
RBC # BLD: 4.1 M/UL (ref 4.2–5.9)
WBC # BLD: 11.7 K/UL (ref 4–11)

## 2019-06-12 PROCEDURE — 85027 COMPLETE CBC AUTOMATED: CPT

## 2019-06-12 PROCEDURE — 36415 COLL VENOUS BLD VENIPUNCTURE: CPT

## 2019-06-12 PROCEDURE — 6370000000 HC RX 637 (ALT 250 FOR IP): Performed by: INTERNAL MEDICINE

## 2019-06-12 PROCEDURE — 2580000003 HC RX 258: Performed by: INTERNAL MEDICINE

## 2019-06-12 PROCEDURE — 2700000000 HC OXYGEN THERAPY PER DAY

## 2019-06-12 PROCEDURE — 94640 AIRWAY INHALATION TREATMENT: CPT

## 2019-06-12 PROCEDURE — 94760 N-INVAS EAR/PLS OXIMETRY 1: CPT

## 2019-06-12 RX ORDER — TORSEMIDE 20 MG/1
20 TABLET ORAL DAILY
Qty: 30 TABLET | Refills: 3 | Status: SHIPPED | OUTPATIENT
Start: 2019-06-12

## 2019-06-12 RX ORDER — PANTOPRAZOLE SODIUM 20 MG/1
40 TABLET, DELAYED RELEASE ORAL DAILY
Qty: 30 TABLET | Refills: 3 | Status: SHIPPED | OUTPATIENT
Start: 2019-06-12

## 2019-06-12 RX ORDER — LOSARTAN POTASSIUM 25 MG/1
25 TABLET ORAL DAILY
Status: DISCONTINUED | OUTPATIENT
Start: 2019-06-12 | End: 2019-06-12 | Stop reason: HOSPADM

## 2019-06-12 RX ADMIN — LOSARTAN POTASSIUM 25 MG: 25 TABLET ORAL at 09:58

## 2019-06-12 RX ADMIN — TIOTROPIUM BROMIDE 18 MCG: 18 CAPSULE ORAL; RESPIRATORY (INHALATION) at 08:36

## 2019-06-12 RX ADMIN — SODIUM CHLORIDE, PRESERVATIVE FREE 10 ML: 5 INJECTION INTRAVENOUS at 10:00

## 2019-06-12 RX ADMIN — ASPIRIN 81 MG: 81 TABLET, COATED ORAL at 09:58

## 2019-06-12 RX ADMIN — MOMETASONE FUROATE AND FORMOTEROL FUMARATE DIHYDRATE 2 PUFF: 100; 5 AEROSOL RESPIRATORY (INHALATION) at 08:36

## 2019-06-12 ASSESSMENT — PAIN SCALES - GENERAL
PAINLEVEL_OUTOF10: 0
PAINLEVEL_OUTOF10: 0

## 2019-06-12 NOTE — PROGRESS NOTES
Patient resting in bed at this time. Patient denies pain, nausea, and/or vomiting. Patient denies needs. Fall precautions remain in place. Bed alarm on. Will continue to monitor patient.

## 2019-06-12 NOTE — PROGRESS NOTES
Progress Note  Admit Date: 2019      PCP: [facility]     CC: F/U for gen weakness    SUBJECTIVE / Interval History:  Feels better  No more black colored stools        Allergies  Amoxicillin-pot clavulanate; Ipratropium; Rivaroxaban; and Vancomycin    Medications    Scheduled Meds:   losartan  25 mg Oral Daily    metoprolol tartrate  12.5 mg Oral BID    cefTRIAXone (ROCEPHIN) IV  1 g Intravenous Q24H    aspirin EC  81 mg Oral Daily    atorvastatin  20 mg Oral Nightly    mometasone-formoterol  2 puff Inhalation BID    nicotine  1 patch Transdermal Q24H    tamsulosin  0.4 mg Oral Nightly    tiotropium  18 mcg Inhalation Daily    sodium chloride flush  10 mL Intravenous 2 times per day     Continuous Infusions:      PRN Meds:  albuterol sulfate HFA, nicotine polacrilex, albuterol, sodium chloride flush, acetaminophen, ondansetron    Vitals    TEMPERATURE:  Current - Temp: 98.3 °F (36.8 °C); Max - Temp  Av.1 °F (36.7 °C)  Min: 97.4 °F (36.3 °C)  Max: 98.5 °F (36.9 °C)  RESPIRATIONS RANGE: Resp  Av.2  Min: 14  Max: 18  PULSE RANGE: Pulse  Av.8  Min: 41  Max: 65  BLOOD PRESSURE RANGE:  Systolic (87BGK), RWT:414 , Min:117 , VYQ:485   ; Diastolic (32BBV), JGD:91, Min:55, Max:76    PULSE OXIMETRY RANGE: SpO2  Av.4 %  Min: 93 %  Max: 98 %  24HR INTAKE/OUTPUT:      Intake/Output Summary (Last 24 hours) at 2019 1133  Last data filed at 2019 0724  Gross per 24 hour   Intake 2100 ml   Output 300 ml   Net 1800 ml       Exam:    Gen: No distress. Eyes: PERRL. No sclera icterus. No conjunctival injection. ENT: No discharge. Pharynx clear. External appearance of ears and nose normal.  Neck: Trachea midline. No obvious mass. Resp: No accessory muscle use. No crackles. No wheezes. No rhonchi. No dullness on percussion. CV: Regular rate. Regular rhythm. No murmur or rub. No edema. GI: Non-tender. Non-distended. No hernia. Skin: Warm, dry, normal texture and turgor.  No nodule on exposed extremities. Lymph: No cervical LAD. No supraclavicular LAD. M/S: No cyanosis. No clubbing. No joint deformity. Neuro: paraplegia . CN 2-12 tested, no defect noted. Psych: Oriented x 3. No anxiety. Awake. Alert. Intact judgement and insight. Data    LABS  CBC:   Recent Labs     06/10/19  0509 06/11/19  1050 06/12/19  0526   WBC 6.8 11.0 11.7*   HGB 11.4* 11.1* 11.4*   HCT 35.4* 34.3* 35.8*   MCV 85.3 85.4 87.3    273 262     BMP:   Recent Labs     06/09/19  1354 06/10/19  0510   * 142   K 3.7 4.0   CL 91* 100   CO2 30 27   BUN 14 17   CREATININE 0.8 0.8     POC GLUCOSE:  No results for input(s): POCGLU in the last 72 hours. LIVER PROFILE:   Recent Labs     06/09/19  1354   AST 43*   ALT 23   LABALBU 3.6   BILITOT <0.2   ALKPHOS 110     PT/INR:   Recent Labs     06/09/19  1354   PROTIME 18.6*   INR 1.63*     APTT: No results for input(s): APTT in the last 72 hours. UA:  Recent Labs     06/09/19  1520 06/09/19  1538   COLORU YELLOW  --    PHUR 7.5  --    WBCUA  --  318*   RBCUA  --  20-50*   YEAST  --  Present*   BACTERIA  --  4+*   CLARITYU TURBID*  --    SPECGRAV 1.015  --    LEUKOCYTESUR LARGE*  --    UROBILINOGEN 0.2  --    BILIRUBINUR SMALL*  --    BLOODU MODERATE*  --    GLUCOSEU Negative  --    KETUA Negative  --      Microbiology:  Wound Culture: No results for input(s): WNDABS, ORG in the last 72 hours. Invalid input(s):  LABGRAM  Nasal Culture: No results for input(s): ORG, MRSAPCR in the last 72 hours. Blood Culture: No results for input(s): BC, BLOODCULT2 in the last 72 hours. Fungal Culture:   No results for input(s): FUNGSM in the last 72 hours. No results for input(s): FUNCXBLD in the last 72 hours. CSF Culture:  No results for input(s): COLORCSF, APPEARCSF, CFTUBE, CLOTCSF, WBCCSF, RBCCSF, NEUTCSF, NUMCELLSCSF, LYMPHSCSF, MONOCSF, GLUCCSF, VOLCSF in the last 72 hours.   Respiratory Culture:  No results for input(s): Jaylene Pries in the last 72 hours.  AFB:No results for input(s): AFBSMEAR in the last 72 hours. Urine Culture  Recent Labs     06/09/19  1538   LABURIN >50,000 CFU/ml Mixed pathogens  Multiple organisms isolated, no predominance. Culture  indicates probable contamination. Please review colony count  and clinical indications to determine if a repeat culture is  necessary. No further workup to be done. RADIOLOGY:    XR CHEST PORTABLE   Final Result   1. No acute cardiopulmonary disease. 2. COPD. CONSULTS:    IP CONSULT TO HOSPITALIST  IP CONSULT TO GI  IP CONSULT TO DIETITIAN  IP CONSULT TO HOME CARE NEEDS    ASSESSMENT AND PLAN:      Active Problems:    COPD exacerbation ([address]    Essential hypertension    UTI (urinary tract infection)    Chronic respiratory failure (HCC)    Gastrointestinal hemorrhage associated with gastritis  Resolved Problems:    * No resolved hospital problems. *    Patient is a 60-year-old male with a past medical history of COPD who comes in with generalized weakness and urinary symptoms. He also had some coffee-ground emesis. Patient was admitted with possible UTI secondary to chronic indwelling Gtz. He was treated with antibiotics. His culture was negative hence antibiotics has been stopped. Patient also had coffee-ground emesis with some melena. Underwent endoscopy which showed a 4 mm nonbleeding angiectasia which was obliterated by APC. There were also multiple gastric nodular erosions with erythema. Patient is started on Protonix. He takes Eliquis and will resume it in the next few days. His aspirin has been held on discharge. Patient blood pressure is running on the lower side. His dose of metoprolol and Demadex have been decreased at the time of discharge.     UTI secondary to chronic indwelling Gtz  - c/s NGTD, ct abx to finish course  -ct abx    Nausea and vomiting  -supportive treatment     Coffee ground emesis- now with melena, monitor H nH   -  EGD shows 4 mm nonbleeding

## 2019-06-12 NOTE — DISCHARGE SUMMARY
c/s NGTD, ct abx to finish course  -ct abx     Nausea and vomiting  -supportive treatment      Coffee ground emesis- now with melena, monitor H nH   -  EGD shows 4 mm nonbleeding angiectasia which was obliterated by APC. 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 , complicated by osteo - bedbound at baseline     Chronic hypoxic respiratory failure  -Uses 4- 5  L of oxygen at home     Chronic hypertension  -monitor  blood pressure in the hospital  -Continue home blood pressure medications, lower dose             Consults:     IP CONSULT TO HOSPITALIST  IP CONSULT TO GI  IP CONSULT TO DIETITIAN  IP CONSULT TO HOME CARE NEEDS        Disposition: home    Discharged Condition: Stable    Code Status: Full Code    Activity: activity as tolerated    Diet: regular diet            Labs:  For convenience and continuity at follow-up the following most recent labs are provided:    CBC:   Lab Results   Component Value Date    WBC 11.7 06/12/2019    HGB 11.4 06/12/2019    HCT 35.8 06/12/2019     06/12/2019       RENAL:   Lab Results   Component Value Date     06/10/2019    K 4.0 06/10/2019     06/10/2019    CO2 27 06/10/2019    BUN 17 06/10/2019    CREATININE 0.8 06/10/2019           Discharge Medications:    [facility] Medication Instructions NUZ:[id number]    Printed on:06/12/19 1144   Medication Information                      albuterol (PROVENTIL) (2.5 MG/3ML) 0.083% nebulizer solution  Take 2.5 mg by nebulization every 6 hours as needed for Wheezing             albuterol sulfate  (90 Base) MCG/ACT inhaler  Inhale 2 puffs into the lungs every 6 hours as needed for Wheezing             apixaban (ELIQUIS) 5 MG TABS tablet  Take 1 tablet by mouth 2 times daily             baclofen (LIORESAL) 10 MG tablet  Take 10 mg by mouth 3 times daily 10mg qam and midday  And 20mg hs             budesonide-formoterol (SYMBICORT) 80-4.5 MCG/ACT AERO  Inhale 2 puffs into the lungs 2 times daily             calcium carbonate (TUMS) 500 MG chewable tablet  Take 1 tablet by mouth 3 times daily Indications: Acid Indigestion             doxycycline hyclate (VIBRA-TABS) 100 MG tablet  Take 100 mg by mouth 2 times daily             ferrous sulfate 325 (65 Fe) MG EC tablet  Take 325 mg by mouth 2 times daily             gabapentin (NEURONTIN) 600 MG tablet  Take 600 mg by mouth 3 times daily. guaiFENesin-dextromethorphan (ROBITUSSIN DM) 100-10 MG/5ML syrup  Take 5 mLs by mouth 4 times daily as needed for Cough             melatonin 3 MG TABS tablet  Take 6 mg by mouth nightly as needed             metoprolol tartrate (LOPRESSOR) 25 MG tablet  Take 0.5 tablets by mouth 2 times daily             pantoprazole (PROTONIX) 20 MG tablet  Take 2 tablets by mouth daily             potassium chloride (KLOR-CON M) 20 MEQ extended release tablet  Take 20 mEq by mouth daily             Roflumilast (DALIRESP) 500 MCG tablet  Take 500 mcg by mouth daily             sulfamethoxazole-trimethoprim (BACTRIM DS;SEPTRA DS) 800-160 MG per tablet  Take 1 tablet by mouth 2 times daily             tamsulosin (FLOMAX) 0.4 MG capsule  Take 0.4 mg by mouth nightly              tiotropium (SPIRIVA) 18 MCG inhalation capsule  Inhale 18 mcg into the lungs daily             torsemide (DEMADEX) 20 MG tablet  Take 1 tablet by mouth daily             vitamin D (CHOLECALCIFEROL) 1000 units TABS tablet  Take 1,000 Units by mouth daily                 No future appointments. Time Spent on discharge is more than 45 minutes in the examination, evaluation, counseling and review of medications and discharge plan. Signed:  Rubio Landrum MD   6/12/2019    The note was completed using EMR. Every effort was made to ensure accuracy; however, inadvertent computerized transcription errors may be present.      Thank you Gagan German [phone] for the opportunity to be involved in this patient's care. If you have any questions or concerns please feel free to contact me at [phone].

## 2019-06-12 NOTE — PLAN OF CARE
Problem: Falls - Risk of:  Goal: Will remain free from falls  Description  Will remain free from falls  6/12/2019 0934 by Drake Sandoval RN  Outcome: Ongoing  Note:   Patient remains free from falls during this shift. Fall precautions remain in place. Problem: Falls - Risk of:  Goal: Absence of physical injury  Description  Absence of physical injury  6/12/2019 0934 by Drake Sandoval RN  Outcome: Ongoing  Note:   Patient remains free from physical injury during this shift. Will continue to monitor. Problem: Risk for Impaired Skin Integrity  Goal: Tissue integrity - skin and mucous membranes  Description  Structural intactness and normal physiological function of skin and  mucous membranes. 6/12/2019 0934 by Drake Sandoval RN  Outcome: Ongoing  Note:   Patient skin integrity and mucus membrane condition remains unchanged at this time. Will continue to monitor. Problem: Sensory:  Goal: General experience of comfort will improve  Description  General experience of comfort will improve  6/12/2019 0934 by Drake Sandoval RN  Outcome: Ongoing  Note:   Patient denies pain at this time. Will continue to monitor. Problem: Urinary Elimination:  Goal: Signs and symptoms of infection will decrease  Description  Signs and symptoms of infection will decrease  6/12/2019 0934 by Drake Sandoval RN  Outcome: Ongoing  Note:   Patient does not present with any signs or symptoms of active infection at this time. Will continue to monitor. Problem: Urinary Elimination:  Goal: Ability to reestablish a normal urinary elimination pattern will improve - after catheter removal  Description  Ability to reestablish a normal urinary elimination pattern will improve  6/12/2019 0934 by Drake Sandoval RN  Outcome: Ongoing  Note:   Patient has a chronic colby.       Problem: Urinary Elimination:  Goal: Complications related to the disease process, condition or treatment will be avoided or minimized  Description  Complications

## 2019-06-12 NOTE — PROGRESS NOTES
Data- discharge order received, pt verbalized agreement to discharge, disposition to previous residence, no needs for HHC/DME. Action- discharge instructions prepared and given to patient and family, pt verbalized understanding. Medication information packet given r/t NEW and/or CHANGED prescriptions emphasizing name/purpose/side effects, pt verbalized understanding. Discharge instruction summary: Diet- general, Activity- wheelchair, Primary Care Physician as follows: Gagan German [id number] None f/u appointment to be scheduled, immunizations reviewed, prescription medications filled and provided to patient through [facility] program. Patient states that he received all 3 required prescriptions prior to leaving. Response- Pt belongings gathered, IV removed. Disposition is home with HHC/DME needs, transported with transport team, taken to lobby via w/c w/ family on 5L of oxygen via home supply, no complications.

## 2019-06-12 NOTE — PLAN OF CARE
Patient remained free of falls and injury during shift. Provided remigio care, and changed linens and gown to improve comfort and assess skin integrity.

## 2019-06-12 NOTE — CARE COORDINATION
Patient discharged; notified [facility] who will follow up as prior to admit. Orders faxed.    Electronically signed by Ole Prater on 6/12/2019 at 11:04 AM   #[id number]

## 2019-06-12 NOTE — PROGRESS NOTES
Patient resting in bed at this time. Patient repositioned per request. Patient denies pain, nausea, and/or vomiting. Patient also denies needs. Fall precautions remain in place. Call light, telephone, and bed side table remain within reach. Sacral dressing in place to prevent skin breakdown. Patient heels elevated off bed. Gtz remains in place. Will continue to monitor.

## 2019-06-24 ENCOUNTER — SCHEDULED TELEPHONE ENCOUNTER (OUTPATIENT)
Dept: PHARMACY | Age: 69
End: 2019-06-24

## 2019-06-26 NOTE — TELEPHONE ENCOUNTER
[address] [place] [address]  COPD Service  [phone]      Follow up phone call:    NAME: Tiara Bustos RECORD NUMBER:  [id number] was referred to our [place] [phone] by [facility] ED. I called and left a message with our phone number to call if interested in scheduling an appointment for COPD follow up. Sapna Schneider Appropriate staff has been notified with regards to any concerns noted above     [address] [place] [address]  COPD Service  Phone: [phone]  Fax [phone]    We also have outpatient services in Heart Failure, Diabetes, Anticoagulation and Infusion.

## 2019-07-09 PROBLEM — N39.0 UTI (URINARY TRACT INFECTION): Status: RESOLVED | Noted: 2019-06-09 | Resolved: 2019-07-09

## 2024-01-01 NOTE — H&P
Hospital Medicine History & Physical      PCP: Gagan German [id number]    Date of Admission: 6/9/2019    Date of Service: Pt seen/examined on 6.9.19 . Patient will be admitted  in/to the hospital.    Chief Complaint:  gen weakness, poss uti       History Of Present Illness:      80 y.o. male who presented to [facility] ed  with above complaints. Symptom onset has been acute for a time period of 2 day(s). Severity is described as mild-moderate. Course of his symptoms over time is constant and worsening. Associated with poor po intake, fatigue , vomit, coffe ground mesis   Not Associated with fever   No h/o of recent travel, ill contacts,weight loss. Pain description -  No pain       Past Medical History:          Diagnosis Date    Aortic valve replaced     [facility]     Hypertension     Paraplegia [facility]        Past Surgical History:          Procedure Laterality Date    AORTIC VALVE REPLACEMENT      SPLENECTOMY         Medications Prior to Admission:      Prior to Admission medications    Medication Sig Start Date End Date Taking?  Authorizing Provider   predniSONE (DELTASONE) 10 MG tablet Take 1-4 tablets by mouth daily Starting 10/21 - prednisone taper 40 mg x 3, 30 mg x 3, 20 mg x 3, then 10 mg x 3 10/21/18   Nicky Collins MD   albuterol (PROVENTIL) (2.5 MG/3ML) 0.083% nebulizer solution Take 2.5 mg by nebulization every 6 hours as needed for Wheezing    Historical Provider, MD   albuterol sulfate  (90 Base) MCG/ACT inhaler Inhale 2 puffs into the lungs every 6 hours as needed for Wheezing    Historical Provider, MD   aspirin EC 81 MG EC tablet Take 81 mg by mouth daily    Historical Provider, MD   atorvastatin (LIPITOR) 40 MG tablet Take 20 mg by mouth nightly     Historical Provider, MD   budesonide-formoterol (SYMBICORT) 80-4.5 MCG/ACT AERO Inhale 2 puffs into the lungs 2 times daily    Historical Provider, MD   losartan (COZAAR) 100 MG tablet Take 50 mg by mouth daily     Historical Provider, MD   metoprolol tartrate (LOPRESSOR) 25 MG tablet Take 25 mg by mouth 2 times daily    Historical Provider, MD   nicotine (NICODERM CQ) 21 MG/24HR Place 1 patch onto the skin every 24 hours    Historical Provider, MD   nicotine polacrilex (COMMIT) 2 MG lozenge Take 2 mg by mouth as needed for Smoking cessation    Historical Provider, MD   omeprazole (PRILOSEC) 20 MG delayed release capsule Take 20 mg by mouth daily    Historical Provider, MD   Roflumilast (DALIRESP) 500 MCG tablet Take 500 mcg by mouth daily    Historical Provider, MD   tamsulosin (FLOMAX) 0.4 MG capsule Take 0.4 mg by mouth nightly     Historical Provider, MD   tiotropium (SPIRIVA) 18 MCG inhalation capsule Inhale 18 mcg into the lungs daily    Historical Provider, MD   guaiFENesin-dextromethorphan (ROBITUSSIN DM) 100-10 MG/5ML syrup Take 5 mLs by mouth 4 times daily as needed for Cough    Historical Provider, MD       Allergies:  Amoxicillin-pot clavulanate; Ipratropium; Rivaroxaban; and Vancomycin    Social History:      The patient currently lives at home       TOBACCO:   reports that he has never smoked. He has never used smokeless tobacco.  ETOH:   reports that he does not drink alcohol. Family History:      Reviewed in detail and d/w patient. Family History   Problem Relation Age of Onset    No Known Problems Mother     No Known Problems Father             PHYSICAL EXAM PERFORMED BY ME:    /71   Pulse 74   Temp 97.5 °F (36.4 °C) (Oral)   Resp 17   Ht 5' 8\" (1.727 m)   Wt 166 lb 10.7 oz (75.6 kg)   SpO2 95%   BMI 25.34 kg/m²     General appearance: comfortable, distress- non e  HEENT: Atraumatic, Pupils equal, round, and reactive to light. Extra ocular muscles intact. Neck: Supple, with full range of motion. No jugular venous distention.    Respiratory:  Clear to  auscultation , accessory muscle use no, Rales/Ronchi no  Cardiovascular: Regular rate and rhythm with [Dear  ___] : Dear  [unfilled], normal S1/S2 ,  Abdomen: Soft, non-tender, non-distended with normal bowel sounds. Musculoskelatal: No clubbing, no edema bilaterally. Dorsalis pedal pulses +   And capillary refills time is  <  than 3 secs. Skin: Skin color, texture, turgor normal.     Neurologic:   Neurovascularly intact grossly non-focal.      CXR:  I have reviewed the CXR with the following interpretation: clear   EKG:  I have reviewed the EKG with the following interpretation: nsr     Labs:     Recent Labs     06/09/19  1354   WBC 13.0*   HGB 13.2*   HCT 41.3        Recent Labs     06/09/19  1354   *   K 3.7   CL 91*   CO2 30   BUN 14   CREATININE 0.8   CALCIUM 10.7*     Recent Labs     06/09/19  1354   AST 43*   ALT 23   BILITOT <0.2   ALKPHOS 110     Recent Labs     06/09/19  1354   INR 1.63*     No results for input(s): Maria Teresa Dark in the last 72 hours. No flowsheet data found. Urinalysis:      Lab Results   Component Value Date    NITRU POSITIVE 06/09/2019    WBCUA 318 06/09/2019    BACTERIA 4+ 06/09/2019    RBCUA 20-50 06/09/2019    BLOODU MODERATE 06/09/2019    SPECGRAV 1.015 06/09/2019    GLUCOSEU Negative 06/09/2019        Diagnostic Assesment and Plan :   1. Admitted for complicated urinary tract infection secondary to chronic indwelling Gtz needs to be replaced, IV Rocephin for now follow-up urine culture. 2.  Nausea vomiting and coffee-ground emesis like vomiting this morning, IVPB A for now, GI consult, IV fluids. Monitor serial hemoglobin. 3.  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 4.  Chronic hypoxic respiratory failure requiring 4 L of oxygen secondary to COPD. 5.  Chronic hypertension, continue on losartan, continue aspirin and statin. Continue metoprolol  Body mass index is 25.34 kg/m². 6.  BPH on Flomax.            The following items were considered in medical decision making:  Independent review of images, Review / order clinical lab tests, Review / [Courtesy Letter:] : I had the pleasure of seeing your patient, [unfilled], in my office today. order radiology, tests Decision to obtain old records. DVT Prophylaxis: lvx   Diet: No diet orders on file  Code Status: Prior    PT/OT Eval Status: na    : na     Dispo - will monitor in floor   Needs to stay in hospital for iv abx . I have discussed the above stated plan with the patient,  and wife   and they verbalized understanding and agreed with the plan. A total time of 15 min were exclusively devoted to discuss plan of care, and advanced care planning during this encounter. RN notified about todays plan  bed side. Dada Nicholas Ada, [address]  This chart was generated in part by using Dragon Dictation system and may contain errors related to that system including errors in grammar, punctuation, and spelling, as well as words and phrases that may be inappropriate. When dictating, effort is made to correct spelling/grammar errors. If there are any questions or concerns please feel free to contact the dictating provider for clarification. Thank you Gagan German [id number] for the opportunity to be involved in this patient's care. If you have any questions or concerns please feel free to contact me at [phone]. [Please see my note below.] : Please see my note below. [Consult Closing:] : Thank you very much for allowing me to participate in the care of this patient.  If you have any questions, please do not hesitate to contact me. [Sincerely,] : Sincerely, [FreeTextEntry3] : Ayo Wiley MD, FICS, FACS\par , Surgical Oncology\par [employer] [facility] [employer] [facility]\par [address]\[place]- [address]\par \par [address]\[place]- [address]\par \par [address]\[place]- [address]\par \par (mob) [phone]\par (o) [phone]\par (f) [phone]\par  13.7

## (undated) DEVICE — ENDOSCOPY KIT: Brand: MEDLINE INDUSTRIES, INC.

## (undated) DEVICE — GROUNDING PAD /C 9FT CORD  PK/25

## (undated) DEVICE — FIAPC® PROBE W/ FILTER 2200 A OD 2.3MM/6.9FR; L 2.2M/7.2FT: Brand: ERBE

## (undated) DEVICE — BITE BLK 60FR GRN ENDOSCP AD W STRP SLD DISPOSABLE